# Patient Record
Sex: FEMALE | Race: BLACK OR AFRICAN AMERICAN | Employment: FULL TIME | URBAN - METROPOLITAN AREA
[De-identification: names, ages, dates, MRNs, and addresses within clinical notes are randomized per-mention and may not be internally consistent; named-entity substitution may affect disease eponyms.]

---

## 2017-02-06 ENCOUNTER — HOSPITAL ENCOUNTER (EMERGENCY)
Age: 32
Discharge: HOME | End: 2017-02-06
Attending: EMERGENCY MEDICINE
Payer: MEDICAID

## 2017-02-06 VITALS
WEIGHT: 213 LBS | DIASTOLIC BLOOD PRESSURE: 92 MMHG | TEMPERATURE: 97.5 F | BODY MASS INDEX: 35.49 KG/M2 | SYSTOLIC BLOOD PRESSURE: 140 MMHG | HEART RATE: 100 BPM | HEIGHT: 65 IN | RESPIRATION RATE: 16 BRPM | OXYGEN SATURATION: 98 %

## 2017-02-06 DIAGNOSIS — B34.9 VIRAL SYNDROME: Primary | ICD-10-CM

## 2017-02-06 LAB
BACTERIA URNS QL MICRO: ABNORMAL /HPF
CASTS URNS QL MICRO: 0 /LPF
CRYSTALS URNS QL MICRO: 0 /LPF
DEPRECATED S PYO AG THROAT QL EIA: NEGATIVE
EPI CELLS #/AREA URNS HPF: ABNORMAL /HPF
FLUAV AG NPH QL IA: NEGATIVE
FLUBV AG NPH QL IA: NEGATIVE
HCG UR QL: NEGATIVE
MUCOUS THREADS URNS QL MICRO: 0 /LPF
OTHER OBSERVATIONS,UCOM: ABNORMAL
RBC #/AREA URNS HPF: ABNORMAL /HPF
WBC URNS QL MICRO: ABNORMAL /HPF

## 2017-02-06 PROCEDURE — 99284 EMERGENCY DEPT VISIT MOD MDM: CPT | Performed by: EMERGENCY MEDICINE

## 2017-02-06 PROCEDURE — 87880 STREP A ASSAY W/OPTIC: CPT | Performed by: EMERGENCY MEDICINE

## 2017-02-06 PROCEDURE — 87081 CULTURE SCREEN ONLY: CPT | Performed by: EMERGENCY MEDICINE

## 2017-02-06 PROCEDURE — 87804 INFLUENZA ASSAY W/OPTIC: CPT | Performed by: EMERGENCY MEDICINE

## 2017-02-06 PROCEDURE — 74011250637 HC RX REV CODE- 250/637: Performed by: EMERGENCY MEDICINE

## 2017-02-06 PROCEDURE — 81015 MICROSCOPIC EXAM OF URINE: CPT | Performed by: EMERGENCY MEDICINE

## 2017-02-06 PROCEDURE — 81025 URINE PREGNANCY TEST: CPT

## 2017-02-06 RX ORDER — ONDANSETRON 4 MG/1
4 TABLET, ORALLY DISINTEGRATING ORAL
Qty: 11 TAB | Refills: 1 | Status: SHIPPED | OUTPATIENT
Start: 2017-02-06 | End: 2018-06-18

## 2017-02-06 RX ORDER — IBUPROFEN 400 MG/1
400 TABLET ORAL
Status: COMPLETED | OUTPATIENT
Start: 2017-02-06 | End: 2017-02-06

## 2017-02-06 RX ORDER — DEXAMETHASONE SODIUM PHOSPHATE 100 MG/10ML
10 INJECTION INTRAMUSCULAR; INTRAVENOUS
Status: COMPLETED | OUTPATIENT
Start: 2017-02-06 | End: 2017-02-06

## 2017-02-06 RX ADMIN — IBUPROFEN 400 MG: 400 TABLET, FILM COATED ORAL at 14:17

## 2017-02-06 RX ADMIN — DEXAMETHASONE SODIUM PHOSPHATE 10 MG: 10 INJECTION INTRAMUSCULAR; INTRAVENOUS at 14:18

## 2017-02-06 NOTE — DISCHARGE INSTRUCTIONS
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
## 2017-02-06 NOTE — LETTER
[facility] EMERGENCY DEPT [address] [address] 
[phone] Work/School Note Date: 2/6/2017 To Whom It May concern: 
 
Yossi Valenzuela was seen and treated today in the emergency room by the following provider(s): 
Attending Provider: Kanu Minor MD. Yossi Valenzuela may return to work on 2/9. Sincerely, 
 
 
 
 
Kanu Minor MD

## 2017-02-06 NOTE — ED TRIAGE NOTES
Per patient generalized body aches since this morning, patient states of throat pain that started yesterday. With intermittent fever and chills.

## 2017-02-08 LAB
BACTERIA SPEC CULT: NORMAL
SERVICE CMNT-IMP: NORMAL

## 2017-05-08 ENCOUNTER — APPOINTMENT (OUTPATIENT)
Dept: GENERAL RADIOLOGY | Age: 32
End: 2017-05-08
Payer: MEDICAID

## 2017-05-08 ENCOUNTER — APPOINTMENT (OUTPATIENT)
Dept: ULTRASOUND IMAGING | Age: 32
End: 2017-05-08
Attending: EMERGENCY MEDICINE
Payer: MEDICAID

## 2017-05-08 ENCOUNTER — HOSPITAL ENCOUNTER (EMERGENCY)
Age: 32
Discharge: HOME | End: 2017-05-08
Attending: EMERGENCY MEDICINE
Payer: MEDICAID

## 2017-05-08 ENCOUNTER — APPOINTMENT (OUTPATIENT)
Dept: CT IMAGING | Age: 32
End: 2017-05-08
Attending: EMERGENCY MEDICINE
Payer: MEDICAID

## 2017-05-08 VITALS
SYSTOLIC BLOOD PRESSURE: 123 MMHG | WEIGHT: 216 LBS | OXYGEN SATURATION: 100 % | HEIGHT: 64 IN | DIASTOLIC BLOOD PRESSURE: 58 MMHG | HEART RATE: 69 BPM | RESPIRATION RATE: 20 BRPM | BODY MASS INDEX: 36.88 KG/M2 | TEMPERATURE: 97.8 F

## 2017-05-08 DIAGNOSIS — R04.2 HEMOPTYSIS: Primary | ICD-10-CM

## 2017-05-08 LAB
ALBUMIN SERPL BCP-MCNC: 3.5 G/DL (ref 3.5–5)
ALBUMIN/GLOB SERPL: 0.9 {RATIO} (ref 1.2–3.5)
ALP SERPL-CCNC: 77 U/L (ref 50–136)
ALT SERPL-CCNC: 26 U/L (ref 12–65)
ANION GAP BLD CALC-SCNC: 9 MMOL/L (ref 7–16)
AST SERPL W P-5'-P-CCNC: 18 U/L (ref 15–37)
ATRIAL RATE: 68 BPM
BASOPHILS # BLD AUTO: 0 K/UL (ref 0–0.2)
BASOPHILS # BLD: 0 % (ref 0–2)
BILIRUB SERPL-MCNC: 0.3 MG/DL (ref 0.2–1.1)
BUN SERPL-MCNC: 12 MG/DL (ref 6–23)
CALCIUM SERPL-MCNC: 8.9 MG/DL (ref 8.3–10.4)
CALCULATED P AXIS, ECG09: 58 DEGREES
CALCULATED R AXIS, ECG10: 57 DEGREES
CALCULATED T AXIS, ECG11: 60 DEGREES
CHLORIDE SERPL-SCNC: 108 MMOL/L (ref 98–107)
CO2 SERPL-SCNC: 25 MMOL/L (ref 21–32)
CREAT SERPL-MCNC: 0.66 MG/DL (ref 0.6–1)
DIAGNOSIS, 93000: NORMAL
DIFFERENTIAL METHOD BLD: ABNORMAL
EOSINOPHIL # BLD: 0.3 K/UL (ref 0–0.8)
EOSINOPHIL NFR BLD: 5 % (ref 0.5–7.8)
ERYTHROCYTE [DISTWIDTH] IN BLOOD BY AUTOMATED COUNT: 12.2 % (ref 11.9–14.6)
GLOBULIN SER CALC-MCNC: 3.8 G/DL (ref 2.3–3.5)
GLUCOSE SERPL-MCNC: 108 MG/DL (ref 65–100)
HCT VFR BLD AUTO: 40.1 % (ref 35.8–46.3)
HGB BLD-MCNC: 13.6 G/DL (ref 11.7–15.4)
IMM GRANULOCYTES # BLD: 0 K/UL (ref 0–0.5)
IMM GRANULOCYTES NFR BLD AUTO: 0.3 % (ref 0–5)
LYMPHOCYTES # BLD AUTO: 37 % (ref 13–44)
LYMPHOCYTES # BLD: 2.2 K/UL (ref 0.5–4.6)
MCH RBC QN AUTO: 31.2 PG (ref 26.1–32.9)
MCHC RBC AUTO-ENTMCNC: 33.9 G/DL (ref 31.4–35)
MCV RBC AUTO: 92 FL (ref 79.6–97.8)
MONOCYTES # BLD: 0.3 K/UL (ref 0.1–1.3)
MONOCYTES NFR BLD AUTO: 5 % (ref 4–12)
NEUTS SEG # BLD: 3.2 K/UL (ref 1.7–8.2)
NEUTS SEG NFR BLD AUTO: 53 % (ref 43–78)
P-R INTERVAL, ECG05: 186 MS
PLATELET # BLD AUTO: 223 K/UL (ref 150–450)
PMV BLD AUTO: 10.7 FL (ref 10.8–14.1)
POTASSIUM SERPL-SCNC: 3.8 MMOL/L (ref 3.5–5.1)
PROT SERPL-MCNC: 7.3 G/DL (ref 6.3–8.2)
Q-T INTERVAL, ECG07: 384 MS
QRS DURATION, ECG06: 80 MS
QTC CALCULATION (BEZET), ECG08: 408 MS
RBC # BLD AUTO: 4.36 M/UL (ref 4.05–5.25)
SODIUM SERPL-SCNC: 142 MMOL/L (ref 136–145)
TROPONIN I BLD-MCNC: 0 NG/ML (ref 0–0.08)
TROPONIN I SERPL-MCNC: <0.02 NG/ML (ref 0.02–0.05)
VENTRICULAR RATE, ECG03: 68 BPM
WBC # BLD AUTO: 6 K/UL (ref 4.3–11.1)

## 2017-05-08 PROCEDURE — 84484 ASSAY OF TROPONIN QUANT: CPT | Performed by: EMERGENCY MEDICINE

## 2017-05-08 PROCEDURE — 74011000258 HC RX REV CODE- 258: Performed by: EMERGENCY MEDICINE

## 2017-05-08 PROCEDURE — 93005 ELECTROCARDIOGRAM TRACING: CPT | Performed by: EMERGENCY MEDICINE

## 2017-05-08 PROCEDURE — 93971 EXTREMITY STUDY: CPT

## 2017-05-08 PROCEDURE — 74011636320 HC RX REV CODE- 636/320: Performed by: EMERGENCY MEDICINE

## 2017-05-08 PROCEDURE — 71020 XR CHEST PA LAT: CPT

## 2017-05-08 PROCEDURE — 85025 COMPLETE CBC W/AUTO DIFF WBC: CPT | Performed by: EMERGENCY MEDICINE

## 2017-05-08 PROCEDURE — 99285 EMERGENCY DEPT VISIT HI MDM: CPT | Performed by: EMERGENCY MEDICINE

## 2017-05-08 PROCEDURE — 71260 CT THORAX DX C+: CPT

## 2017-05-08 PROCEDURE — 80053 COMPREHEN METABOLIC PANEL: CPT | Performed by: EMERGENCY MEDICINE

## 2017-05-08 RX ORDER — SODIUM CHLORIDE 0.9 % (FLUSH) 0.9 %
10 SYRINGE (ML) INJECTION
Status: COMPLETED | OUTPATIENT
Start: 2017-05-08 | End: 2017-05-08

## 2017-05-08 RX ORDER — DOXYCYCLINE HYCLATE 100 MG
100 TABLET ORAL 2 TIMES DAILY
Qty: 20 TAB | Refills: 0 | Status: SHIPPED | OUTPATIENT
Start: 2017-05-08 | End: 2017-05-18

## 2017-05-08 RX ADMIN — SODIUM CHLORIDE 100 ML: 900 INJECTION, SOLUTION INTRAVENOUS at 12:31

## 2017-05-08 RX ADMIN — IOPAMIDOL 100 ML: 755 INJECTION, SOLUTION INTRAVENOUS at 12:31

## 2017-05-08 RX ADMIN — Medication 10 ML: at 12:31

## 2017-05-08 NOTE — ED TRIAGE NOTES
Reports coughing up blood x 3 days. States is in the mornings. States chest hurts when she breathes and her inhaler helps.
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
## 2017-05-08 NOTE — DISCHARGE INSTRUCTIONS
Coughing Up Blood: Care Instructions  Your Care Instructions  Coughing up blood can be frightening. The blood may come from the lungs, stomach, or throat. You may cough up a few thin streaks of bright red blood. This probably is not a cause for concern. Coughing up large amounts of bright red blood or rust-colored mucus from the lungs can be a symptom of a more serious condition. Several conditions can make you cough up blood from the lungs. These include bronchitis and pneumonia, or more serious problems such as cancer or a blood clot in the lung (pulmonary embolus). Depending on what is causing your cough, it may go away after the illness is treated. Your doctor may tell you not to suppress the cough with cough medicine if it is better for you to cough up the blood and spit it out. Follow-up care is a key part of your treatment and safety. Be sure to make and go to all appointments, and call your doctor if you are having problems. Its also a good idea to know your test results and keep a list of the medicines you take. How can you care for yourself at home? · Make a note of when and for how long you cough up blood. Also note if you are coughing up spit with a small amount of blood, or mostly blood. Take this information to your next appointment with your doctor. · Increase your fluid intake to at least 8 to 10 glasses of water every day. This helps keep the mucus thin and helps you cough it up. If you have kidney, heart, or liver disease and have to limit fluids, talk with your doctor before you increase your fluid intake. · If your doctor prescribed antibiotics, take them as directed. Do not stop taking them just because you feel better. You need to take the full course of antibiotics. · Do not take cough medicine without your doctor's guidance. They can cause problems if you have other health problems. They can also interact with other medicine.   · Do not smoke or use other forms of tobacco, especially while you have a cough. Smoking can make coughing worse. If you need help quitting, talk to your doctor about stop-smoking programs and medicines. These can increase your chances of quitting for good. · Avoid exposure to smoke, dust, or other pollutants. When should you call for help? Call 911 anytime you think you may need emergency care. For example, call if:  · You have sudden chest pain and shortness of breath. · You have severe trouble breathing. Call your doctor now or seek immediate medical care if:  · You have wheezing and difficulty breathing. · You are dizzy or lightheaded, or you feel like you may faint. · You cough up clots of blood. Watch closely for changes in your health, and be sure to contact your doctor if:  · You do not get better as expected. · You have any new symptoms, such as chest pain with difficulty breathing or a fever. Where can you learn more? Go to http://shira-kelvin.info/. Enter M550 in the search box to learn more about \"Coughing Up Blood: Care Instructions. \"  Current as of: May 27, 2016  Content Version: 11.2  © 0321-3740 [employer]. Care instructions adapted under license by [employer] (which disclaims liability or warranty for this information). If you have questions about a medical condition or this instruction, always ask your healthcare professional. [address] any warranty or liability for your use of this information. Doxycycline (By mouth)   Doxycycline ([id number])  Treats and prevents infections. Also used to prevent malaria and treat rosacea or severe acne. This medicine is a tetracycline antibiotic.    Brand Name(s): Acticlate, Adoxa, Adoxa Festus 1/150, Avidoxy, Avidoxy DK, BenzoDox 30 Kit, BenzoDox 60 Kit, Doryx, Doryx MPC, Monodox, Morgidox [id number], Morgidox [id number] Kit, Morgidox 1x50MG, Morgidox 1x50MG Kit, Morgidox [id number]   There may be other brand names for this medicine. When This Medicine Should Not Be Used: This medicine is not right for everyone. Do not use it if you had an allergic reaction to doxycycline or another tetracycline antibiotic, or if you are pregnant or breastfeeding. How to Use This Medicine:   Capsule, Delayed Release Capsule, Long Acting Capsule, Liquid, Tablet, Delayed Release Tablet  · Your doctor will tell you how much medicine to use. Do not use more than directed. · Ask your pharmacist or doctor if you need to take this medicine with or without food. Some forms can be taken with food or milk, but others must be taken on an empty stomach. · Tablets: You may take this medicine with food or milk to avoid stomach irritation. To break a tablet, hold the tablet between your thumb and index fingers close to the appropriate scored line. Then, apply enough pressure to snap the tablet segments apart. Do not use the tablet if it does not break on the scored lines. · Delayed-release tablets: You may also take this medicine by sprinkling the broken tablets onto room-temperature applesauce. Swallow this mixture right away; do not chew. Do not store the mixture for later use. · Oracea® capsules: This medicine must be taken on an empty stomach, at least 1 hour before or 2 hours after a meal.  · Capsule: Swallow whole. Do not break, crush, chew, or open it. · Oral liquid: Shake the bottle well just before each use. Measure the oral liquid medicine with a marked measuring spoon, oral syringe, or medicine cup. · Take all of the medicine in your prescription to clear up your infection, even if you feel better after the first few doses. · Drink plenty of fluids to avoid throat problems, if you take the capsule or tablet form. · Malaria prevention: Start taking the medicine 1 or 2 days before you travel. Take the medicine every day during your trip. Keep taking it for 4 weeks after you return. However, do not use the medicine for longer than 4 months.   · Do not use this medicine for more than 9 months if you are using it for rosacea. · Use only the brand of medicine your doctor prescribed. Other brands may not work the same way. · Read and follow the patient instructions that come with this medicine. Talk to your doctor or pharmacist if you have any questions. · Missed dose: Take a dose as soon as you remember. If it is almost time for your next dose, wait until then and take a regular dose. Do not take extra medicine to make up for a missed dose. · Store the medicine in a closed container at room temperature, away from heat, moisture, and direct light. Do not freeze the oral liquid. Drugs and Foods to Avoid:   Ask your doctor or pharmacist before using any other medicine, including over-the-counter medicines, vitamins, and herbal products. · Some foods and medicines can affect how doxycycline works. Tell your doctor if you are using any of the following:  ¨ Bismuth subsalicylate, isotretinoin or other acne medicines, acitretin or other medicine to treat psoriasis  ¨ Penicillin antibiotic, birth control pills, medicine for seizures (such as carbamazepine, phenobarbital, phenytoin), stomach medicine, a blood thinner (such as warfarin), or any medicine that contains aluminum, calcium, or iron (such an antacid or vitamin supplement)  Warnings While Using This Medicine:   · This medicine may cause birth defects if either partner is using it during conception or pregnancy. Tell your doctor right away if you or your partner becomes pregnant. Birth control pills may not work as well when used with this medicine. Use a second form of birth control to keep from getting pregnant. · Tell your doctor if you have kidney disease, liver disease, asthma, or an allergy to sulfites. Tell your doctor if you had surgery on your stomach, or if you have a history of yeast infections.   · This medicine may cause the following problems:  ¨ Permanent change in tooth color (in children younger than 6years old)  ¨ Increased pressure inside the head  ¨ Yeast infection  ¨ Immune system problems  · This medicine can cause diarrhea. Call your doctor if the diarrhea becomes severe, does not stop, or is bloody. Do not take any medicine to stop diarrhea until you have talked to your doctor. Diarrhea can occur 2 months or more after you stop taking this medicine. · This medicine may make your skin more sensitive to sunlight. Wear sunscreen. Do not use sunlamps or tanning beds. · Tell any doctor or dentist who treats you that you are using this medicine. This medicine may affect certain medical test results. · Call your doctor if your symptoms do not improve or if they get worse. · Keep all medicine out of the reach of children. Never share your medicine with anyone. Possible Side Effects While Using This Medicine:   Call your doctor right away if you notice any of these side effects:  · Allergic reaction: Itching or hives, swelling in your face or hands, swelling or tingling in your mouth or throat, chest tightness, trouble breathing  · Blistering, peeling, red skin rash  · Burning, pain, or irritation in your upper stomach or throat  · Diarrhea that may contain blood  · Fever, chills, cough, runny or stuffy nose, sore throat, and body aches  · Joint pain, fever, rash, and unusual tiredness or weakness  · Severe headache, dizziness, or vision changes  If you notice these less serious side effects, talk with your doctor:   · Darkening of your skin, scars, teeth, or gums  · Sores or white patches on your lips, mouth, or throat  If you notice other side effects that you think are caused by this medicine, tell your doctor. Call your doctor for medical advice about side effects. You may report side effects to FDA at 4-598-FDA-2783  © 2017 [facility] Information is for End User's use only and may not be sold, redistributed or otherwise used for commercial purposes.   The above information is an  only. It is not intended as medical advice for individual conditions or treatments. Talk to your doctor, nurse or pharmacist before following any medical regimen to see if it is safe and effective for you.

## 2018-06-18 ENCOUNTER — HOSPITAL ENCOUNTER (EMERGENCY)
Age: 33
Discharge: HOME | End: 2018-06-18
Attending: EMERGENCY MEDICINE
Payer: MEDICAID

## 2018-06-18 ENCOUNTER — APPOINTMENT (OUTPATIENT)
Dept: GENERAL RADIOLOGY | Age: 33
End: 2018-06-18
Payer: MEDICAID

## 2018-06-18 VITALS
SYSTOLIC BLOOD PRESSURE: 122 MMHG | BODY MASS INDEX: 32.74 KG/M2 | HEIGHT: 68 IN | HEART RATE: 76 BPM | DIASTOLIC BLOOD PRESSURE: 82 MMHG | RESPIRATION RATE: 18 BRPM | OXYGEN SATURATION: 100 % | WEIGHT: 216 LBS

## 2018-06-18 DIAGNOSIS — T14.8XXA MUSCLE STRAIN: ICD-10-CM
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
## 2018-06-18 PROCEDURE — 72100 X-RAY EXAM L-S SPINE 2/3 VWS: CPT

## 2018-06-18 PROCEDURE — 99283 EMERGENCY DEPT VISIT LOW MDM: CPT | Performed by: PHYSICIAN ASSISTANT

## 2018-06-18 RX ORDER — NAPROXEN 500 MG/1
500 TABLET ORAL 2 TIMES DAILY WITH MEALS
Qty: 20 TAB | Refills: 0 | Status: SHIPPED | OUTPATIENT
Start: 2018-06-18 | End: 2018-06-28

## 2018-06-18 RX ORDER — METHOCARBAMOL 750 MG/1
750 TABLET, FILM COATED ORAL 3 TIMES DAILY
Qty: 30 TAB | Refills: 0 | Status: SHIPPED | OUTPATIENT
Start: 2018-06-18 | End: 2018-06-28

## 2018-06-18 NOTE — Clinical Note
Use meds as directed alternate ice and heat to all sore areas, call your primary md office in am to arrange follow up appt
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
## 2018-06-18 NOTE — LETTER
[facility] EMERGENCY DEPT One [address] [address] [phone] Work/School Note Date: 6/18/2018 To Whom It May concern: 
 
Rexie Riedel was seen and treated today in the emergency room by the following provider(s): 
Attending Provider: Carie Walker MD 
Physician Assistant: JONATHAN Arcos. Rexie Riedel may return to work on 6-20-18. Sincerely, JONATHAN Arcos
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
## 2018-06-18 NOTE — ED NOTES
I have reviewed discharge instructions with the patient. The patient verbalized understanding. Patient left ED via Discharge Method: ambulatory to Home with self    Opportunity for questions and clarification provided. Patient given 2 scripts. To continue your aftercare when you leave the hospital, you may receive an automated call from our care team to check in on how you are doing. This is a free service and part of our promise to provide the best care and service to meet your aftercare needs.  If you have questions, or wish to unsubscribe from this service please call [phone]. Thank you for Choosing our [facility] Emergency Department.

## 2018-09-06 PROCEDURE — 99283 EMERGENCY DEPT VISIT LOW MDM: CPT | Performed by: EMERGENCY MEDICINE

## 2018-09-07 ENCOUNTER — APPOINTMENT (OUTPATIENT)
Dept: GENERAL RADIOLOGY | Age: 33
End: 2018-09-07
Attending: EMERGENCY MEDICINE
Payer: MEDICAID

## 2018-09-07 ENCOUNTER — HOSPITAL ENCOUNTER (EMERGENCY)
Age: 33
Discharge: HOME | End: 2018-09-07
Attending: EMERGENCY MEDICINE
Payer: MEDICAID

## 2018-09-07 VITALS
OXYGEN SATURATION: 100 % | DIASTOLIC BLOOD PRESSURE: 86 MMHG | BODY MASS INDEX: 32.74 KG/M2 | SYSTOLIC BLOOD PRESSURE: 151 MMHG | WEIGHT: 216 LBS | TEMPERATURE: 98 F | HEART RATE: 96 BPM | RESPIRATION RATE: 20 BRPM | HEIGHT: 68 IN

## 2018-09-07 DIAGNOSIS — B34.9 VIRAL ILLNESS: Primary | ICD-10-CM

## 2018-09-07 DIAGNOSIS — J45.41 MODERATE PERSISTENT REACTIVE AIRWAY DISEASE WITH ACUTE EXACERBATION: ICD-10-CM

## 2018-09-07 LAB
FLUAV AG NPH QL IA: NEGATIVE
FLUBV AG NPH QL IA: NEGATIVE
SPECIMEN SOURCE: NORMAL

## 2018-09-07 PROCEDURE — 87804 INFLUENZA ASSAY W/OPTIC: CPT

## 2018-09-07 PROCEDURE — 74011636637 HC RX REV CODE- 636/637: Performed by: EMERGENCY MEDICINE

## 2018-09-07 PROCEDURE — 74011000250 HC RX REV CODE- 250: Performed by: EMERGENCY MEDICINE

## 2018-09-07 PROCEDURE — 94640 AIRWAY INHALATION TREATMENT: CPT

## 2018-09-07 PROCEDURE — 71046 X-RAY EXAM CHEST 2 VIEWS: CPT

## 2018-09-07 RX ORDER — ALBUTEROL SULFATE 2.5 MG/.5ML
5 SOLUTION RESPIRATORY (INHALATION)
Status: COMPLETED | OUTPATIENT
Start: 2018-09-07 | End: 2018-09-07

## 2018-09-07 RX ORDER — ALBUTEROL SULFATE 90 UG/1
2 AEROSOL, METERED RESPIRATORY (INHALATION)
Qty: 1 INHALER | Refills: 2 | Status: SHIPPED | OUTPATIENT
Start: 2018-09-07

## 2018-09-07 RX ORDER — PREDNISONE 50 MG/1
50 TABLET ORAL DAILY
Qty: 5 TAB | Refills: 0 | Status: SHIPPED | OUTPATIENT
Start: 2018-09-07 | End: 2018-09-12

## 2018-09-07 RX ORDER — OXYMETAZOLINE HCL 0.05 %
2 SPRAY, NON-AEROSOL (ML) NASAL 2 TIMES DAILY
Qty: 1 EACH | Refills: 0 | Status: SHIPPED | OUTPATIENT
Start: 2018-09-07 | End: 2018-09-10

## 2018-09-07 RX ADMIN — PREDNISONE 60 MG: 50 TABLET ORAL at 02:16

## 2018-09-07 RX ADMIN — ALBUTEROL SULFATE 5 MG: 2.5 SOLUTION RESPIRATORY (INHALATION) at 02:05

## 2018-09-07 NOTE — LETTER
[address] [facility] EMERGENCY DEPT One [address] [address] 
[phone] Work/School Note Date: 9/6/2018 To Whom It May concern: 
 
Charlene Bowling was seen and treated today in the emergency room by the following provider(s): 
Attending Provider: Yoselyn De Leon MD. Charlene Bowling may return to work on Sunday September 9th 2018. Sincerely, Charles Quintanilla RN

## 2018-09-07 NOTE — ED NOTES
To bedside to medicate. Patient upset. Patient states \"this is fucking bullshit. That doctor isn't going to give me any pain medicine for my pain. \" I advised patient that Dr. Katey Ribeiro is appropriately covering her wheeze and that reduction in wheeze should help with some of her discomfort. In addition, I advised patient that anti-inflammatories would be appropriate for home use. Patient states [place] what the fuck ever. \"  Due to high census, multiple patients in [facility] within [place] [address]. I asked the patient to please refrain from using obscenities. Patient became more irate and states \"I can say whatever the fuck I want. \"  I attempted to calm patient with patient becoming more and more angry. Patient asked for my name which I provided. Patient states \"I'm going to have your fucking job. Just get away from me and get the doctor to write those prescriptions. \"

## 2018-09-07 NOTE — DISCHARGE INSTRUCTIONS
Using a Metered-Dose Inhaler: Care Instructions  Your Care Instructions    A metered-dose inhaler lets you breathe medicine into your lungs quickly. Inhaled medicine works faster than the same medicine in a pill. An inhaler allows you to take less medicine than you would need if you took it as a pill. \"Metered-dose\" means that the inhaler gives a measured amount of medicine each time you use it. A metered-dose inhaler gives medicine in the form of a liquid mist.  Your doctor may want you to use a spacer with your inhaler. A spacer is a chamber that you attach to the inhaler. The chamber holds the medicine before you inhale it. That way, you can inhale the medicine in as many breaths as you need. Doctors recommend using a spacer with most metered-dose inhalers, especially those with corticosteroid medicines. Follow-up care is a key part of your treatment and safety. Be sure to make and go to all appointments, and call your doctor if you are having problems. It's also a good idea to know your test results and keep a list of the medicines you take. How can you care for yourself at home? To get started using your inhaler  · Talk with your health care provider to be sure you are using your inhaler the right way. It might help if you practice using it in front of a mirror. Use the inhaler exactly as prescribed. · Check that you have the correct medicine. If you use more than one inhaler, put a label on each one. This will let you know which one to use at the right time. · Keep track of how much medicine is in the inhaler. Check the label to see how many doses are in the container. If you know how many puffs you can take, you can replace the inhaler before you run out. Ask your health care provider how you can keep track of how much medicine is left. · Use a spacer if you have problems pressing the inhaler and breathing in at the same time.  You also may need a spacer if you are using corticosteroid medicines. · If you are using a corticosteroid inhaler, gargle and rinse out your mouth with water after use. Do not swallow the water. Swallowing the water will increase the chance that the medicine will get into your bloodstream. This may make it more likely that you will have side effects. To use a spacer with an inhaler  1. Shake the inhaler. Remove the inhaler cap, and place the mouthpiece of the inhaler into the spacer. Check the inhaler instructions to see if you need to prime your inhaler before you use it. If it needs priming, follow the instructions on how to prime your inhaler. 2. Remove the cap from the spacer. 3. Hold the inhaler upright with the mouthpiece at the bottom. 4. Tilt your head back a little, and breathe out slowly and completely. 5. Place the spacer's mouthpiece in your mouth. 6. Press down on the inhaler to spray one puff of medicine into the spacer, and then start breathing in slowly. Wait to inhale until after you have pressed down on the inhaler. Some spacers have a whistle. If you hear it, you should breathe in more slowly. 7. Hold your breath for 10 seconds. This will let the medicine settle in your lungs. 8. If you need to take a second dose, wait 30 to 60 seconds to allow the inhaler valve to refill. To use an inhaler without a spacer  1. Shake the inhaler as directed. Remove the cap. Check the instructions to see if you need to prime your inhaler before you use it. If it needs priming, follow the instructions on how to prime your inhaler. 2. Hold the inhaler upright with the mouthpiece at the bottom. 3. Tilt your head back a little, and breathe out slowly and completely. 4. Position the inhaler in one of two ways:  ¨ You can place the inhaler in your mouth. This is easier for most people. And it lowers the risk that any of the medicine will get into your eyes. ¨ Or you can place the inhaler 1 to 2 inches in front of your open mouth, without closing your lips over it. Try to open your mouth as wide as you can. Placing the inhaler in front of your open mouth may be better for getting the medicine into your lungs. But some people may find this too hard to do. 5. Start taking slow, even breaths through your mouth. Press down on the inhaler once, then inhale fully. 6. Hold your breath for 10 seconds. This will let the medicine settle in your lungs. 7. If you need to take a second dose, wait 30 to 60 seconds to allow the inhaler valve to refill. Where can you learn more? Go to http://shira-kelvin.info/. Enter K111 in the search box to learn more about \"Using a Metered-Dose Inhaler: Care Instructions. \"  Current as of: November 12, 2017  Content Version: 11.7  © 8918-6784 [employer]. Care instructions adapted under license by [employer] (which disclaims liability or warranty for this information). If you have questions about a medical condition or this instruction, always ask your healthcare professional. [place] [address] any warranty or liability for your use of this information. Viral Infections: Care Instructions  Your Care Instructions    You don't feel well, but it's not clear what's causing it. You may have a viral infection. Viruses cause many illnesses, such as the common cold, influenza, fever, rashes, and the diarrhea, nausea, and vomiting that are often called \"stomach flu. \" You may wonder if antibiotic medicines could make you feel better. But antibiotics only treat infections caused by bacteria. They don't work on viruses. The good news is that viral infections usually aren't serious. Most will go away in a few days without medical treatment. In the meantime, there are a few things you can do to make yourself more comfortable. Follow-up care is a key part of your treatment and safety. Be sure to make and go to all appointments, and call your doctor if you are having problems.  It's also a good idea to know your test results and keep a list of the medicines you take. How can you care for yourself at home? · Get plenty of rest if you feel tired. · Take an over-the-counter pain medicine if needed, such as acetaminophen (Tylenol), ibuprofen (Advil, Motrin), or naproxen (Aleve). Read and follow all instructions on the label. · Be careful when taking over-the-counter cold or flu medicines and Tylenol at the same time. Many of these medicines have acetaminophen, which is Tylenol. Read the labels to make sure that you are not taking more than the recommended dose. Too much acetaminophen (Tylenol) can be harmful. · Drink plenty of fluids, enough so that your urine is light yellow or clear like water. If you have kidney, heart, or liver disease and have to limit fluids, talk with your doctor before you increase the amount of fluids you drink. · Stay home from work, school, and other public places while you have a fever. When should you call for help? Call 911 anytime you think you may need emergency care. For example, call if:    · You have severe trouble breathing.     · You passed out (lost consciousness).    Call your doctor now or seek immediate medical care if:    · You seem to be getting much sicker.     · You have a new or higher fever.     · You have blood in your stools.     · You have new belly pain, or your pain gets worse.     · You have a new rash.    Watch closely for changes in your health, and be sure to contact your doctor if:    · You start to get better and then get worse.     · You do not get better as expected. Where can you learn more? Go to http://shira-kelvin.info/. Enter X650 in the search box to learn more about \"Viral Infections: Care Instructions. \"  Current as of: November 18, 2017  Content Version: 11.7  © 5646-6993 [employer], [employer].  Care instructions adapted under license by [employer] (which disclaims liability or warranty for this information). If you have questions about a medical condition or this instruction, always ask your healthcare professional. [place] [address] any warranty or liability for your use of this information.
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
## 2018-09-07 NOTE — ED NOTES
I have reviewed discharge instructions with the patient. The patient verbalized understanding. Patient to follow up with PMD and RTED with any changes/concerns. Patient expresses understanding. Patient ambulatory from ED in NAD with Rx x 3.  Patient provided with work excuse upon request.

## 2018-11-29 ENCOUNTER — HOSPITAL ENCOUNTER (EMERGENCY)
Age: 33
Discharge: HOME | End: 2018-11-29
Attending: EMERGENCY MEDICINE
Payer: MEDICAID

## 2018-11-29 VITALS
DIASTOLIC BLOOD PRESSURE: 74 MMHG | BODY MASS INDEX: 32.74 KG/M2 | HEIGHT: 68 IN | HEART RATE: 74 BPM | OXYGEN SATURATION: 99 % | TEMPERATURE: 98.2 F | WEIGHT: 216.05 LBS | SYSTOLIC BLOOD PRESSURE: 135 MMHG | RESPIRATION RATE: 16 BRPM

## 2018-11-29 DIAGNOSIS — A59.9 TRICHOMONOSIS: Primary | ICD-10-CM

## 2018-11-29 LAB
BACTERIA URNS QL MICRO: 0 /HPF
CASTS URNS QL MICRO: NORMAL /LPF
EPI CELLS #/AREA URNS HPF: NORMAL /HPF
HCG UR QL: NEGATIVE
RBC #/AREA URNS HPF: NORMAL /HPF
SERVICE CMNT-IMP: NORMAL
WBC URNS QL MICRO: NORMAL /HPF
WET PREP GENITAL: NORMAL

## 2018-11-29 PROCEDURE — 99284 EMERGENCY DEPT VISIT MOD MDM: CPT | Performed by: EMERGENCY MEDICINE

## 2018-11-29 PROCEDURE — 74011250636 HC RX REV CODE- 250/636: Performed by: NURSE PRACTITIONER

## 2018-11-29 PROCEDURE — 87210 SMEAR WET MOUNT SALINE/INK: CPT

## 2018-11-29 PROCEDURE — 96372 THER/PROPH/DIAG INJ SC/IM: CPT | Performed by: EMERGENCY MEDICINE

## 2018-11-29 PROCEDURE — 87086 URINE CULTURE/COLONY COUNT: CPT

## 2018-11-29 PROCEDURE — 81025 URINE PREGNANCY TEST: CPT

## 2018-11-29 PROCEDURE — 87491 CHLMYD TRACH DNA AMP PROBE: CPT

## 2018-11-29 PROCEDURE — 74011250637 HC RX REV CODE- 250/637: Performed by: NURSE PRACTITIONER

## 2018-11-29 PROCEDURE — 81015 MICROSCOPIC EXAM OF URINE: CPT

## 2018-11-29 PROCEDURE — 81003 URINALYSIS AUTO W/O SCOPE: CPT | Performed by: EMERGENCY MEDICINE

## 2018-11-29 RX ORDER — AZITHROMYCIN 250 MG/1
1000 TABLET, FILM COATED ORAL
Status: COMPLETED | OUTPATIENT
Start: 2018-11-29 | End: 2018-11-29

## 2018-11-29 RX ORDER — METRONIDAZOLE 500 MG/1
2000 TABLET ORAL
Qty: 4 TAB | Refills: 0 | Status: SHIPPED | OUTPATIENT
Start: 2018-11-29 | End: 2018-11-29

## 2018-11-29 RX ADMIN — AZITHROMYCIN 1000 MG: 250 TABLET, FILM COATED ORAL at 13:34

## 2018-11-29 RX ADMIN — CEFTRIAXONE SODIUM 250 MG: 250 INJECTION, POWDER, FOR SOLUTION INTRAMUSCULAR; INTRAVENOUS at 13:34
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
## 2018-11-29 NOTE — DISCHARGE INSTRUCTIONS
Flagyl as prescribed. No sex for 10 days after treatment. Your partner will need to be treated or you will get reinfected. Someone will call you in 4-5 days if your cultures are positive. Return to the Emergency Department for any new or worse symptoms.

## 2018-11-29 NOTE — ED NOTES
I have reviewed discharge instructions with the patient. The patient verbalized understanding. Patient left ED via Discharge Method: ambulatory to Home with (self). Opportunity for questions and clarification provided. Patient given 1 scripts. To continue your aftercare when you leave the hospital, you may receive an automated call from our care team to check in on how you are doing. This is a free service and part of our promise to provide the best care and service to meet your aftercare needs.  If you have questions, or wish to unsubscribe from this service please call [phone]. Thank you for Choosing our [facility] Emergency Department.

## 2018-11-29 NOTE — ED TRIAGE NOTES
Patient having urinary burning, frequency since Friday. Was seen doctors care, was given Cipro for UTI, states symptoms are not getting better.

## 2018-12-01 LAB
C TRACH RRNA SPEC QL NAA+PROBE: NEGATIVE
N GONORRHOEA RRNA SPEC QL NAA+PROBE: NEGATIVE
SPECIMEN SOURCE: NORMAL

## 2018-12-02 LAB
BACTERIA SPEC CULT: NORMAL
SERVICE CMNT-IMP: NORMAL

## 2020-06-14 LAB
BASOPHILS # BLD: 0 K/UL (ref 0–0.2)
BASOPHILS NFR BLD: 0 % (ref 0–2)
DIFFERENTIAL METHOD BLD: NORMAL
EOSINOPHIL # BLD: 0.6 K/UL (ref 0–0.8)
EOSINOPHIL NFR BLD: 6 % (ref 0.5–7.8)
ERYTHROCYTE [DISTWIDTH] IN BLOOD BY AUTOMATED COUNT: 12.5 % (ref 11.9–14.6)
HCT VFR BLD AUTO: 39.1 % (ref 35.8–46.3)
HGB BLD-MCNC: 13.4 G/DL (ref 11.7–15.4)
IMM GRANULOCYTES # BLD AUTO: 0 K/UL (ref 0–0.5)
IMM GRANULOCYTES NFR BLD AUTO: 0 % (ref 0–5)
LYMPHOCYTES # BLD: 3.1 K/UL (ref 0.5–4.6)
LYMPHOCYTES NFR BLD: 34 % (ref 13–44)
MCH RBC QN AUTO: 31.3 PG (ref 26.1–32.9)
MCHC RBC AUTO-ENTMCNC: 34.3 G/DL (ref 31.4–35)
MCV RBC AUTO: 91.4 FL (ref 79.6–97.8)
MONOCYTES # BLD: 0.5 K/UL (ref 0.1–1.3)
MONOCYTES NFR BLD: 6 % (ref 4–12)
NEUTS SEG # BLD: 4.8 K/UL (ref 1.7–8.2)
NEUTS SEG NFR BLD: 53 % (ref 43–78)
NRBC # BLD: 0 K/UL (ref 0–0.2)
PLATELET # BLD AUTO: 235 K/UL (ref 150–450)
PMV BLD AUTO: 10.6 FL (ref 9.4–12.3)
RBC # BLD AUTO: 4.28 M/UL (ref 4.05–5.2)
WBC # BLD AUTO: 9.1 K/UL (ref 4.3–11.1)

## 2020-06-14 PROCEDURE — 84484 ASSAY OF TROPONIN QUANT: CPT

## 2020-06-14 PROCEDURE — 96374 THER/PROPH/DIAG INJ IV PUSH: CPT

## 2020-06-14 PROCEDURE — 85025 COMPLETE CBC W/AUTO DIFF WBC: CPT

## 2020-06-14 PROCEDURE — 81003 URINALYSIS AUTO W/O SCOPE: CPT

## 2020-06-14 PROCEDURE — 93005 ELECTROCARDIOGRAM TRACING: CPT | Performed by: EMERGENCY MEDICINE

## 2020-06-14 PROCEDURE — 83690 ASSAY OF LIPASE: CPT

## 2020-06-14 PROCEDURE — 80074 ACUTE HEPATITIS PANEL: CPT

## 2020-06-14 PROCEDURE — 80307 DRUG TEST PRSMV CHEM ANLYZR: CPT

## 2020-06-14 PROCEDURE — 80053 COMPREHEN METABOLIC PANEL: CPT

## 2020-06-14 PROCEDURE — 96375 TX/PRO/DX INJ NEW DRUG ADDON: CPT

## 2020-06-14 PROCEDURE — 99285 EMERGENCY DEPT VISIT HI MDM: CPT

## 2020-06-15 ENCOUNTER — HOSPITAL ENCOUNTER (EMERGENCY)
Age: 35
Discharge: HOME | End: 2020-06-15
Attending: EMERGENCY MEDICINE
Payer: COMMERCIAL

## 2020-06-15 ENCOUNTER — APPOINTMENT (OUTPATIENT)
Dept: ULTRASOUND IMAGING | Age: 35
End: 2020-06-15
Attending: EMERGENCY MEDICINE
Payer: COMMERCIAL

## 2020-06-15 VITALS
HEIGHT: 63 IN | RESPIRATION RATE: 18 BRPM | SYSTOLIC BLOOD PRESSURE: 129 MMHG | HEART RATE: 99 BPM | OXYGEN SATURATION: 100 % | DIASTOLIC BLOOD PRESSURE: 62 MMHG | WEIGHT: 200 LBS | TEMPERATURE: 98.7 F | BODY MASS INDEX: 35.44 KG/M2

## 2020-06-15 DIAGNOSIS — K80.00 CALCULUS OF GALLBLADDER WITH ACUTE CHOLECYSTITIS WITHOUT OBSTRUCTION: Primary | ICD-10-CM

## 2020-06-15 LAB
ALBUMIN SERPL-MCNC: 3.9 G/DL (ref 3.5–5)
ALBUMIN/GLOB SERPL: 1 {RATIO} (ref 1.2–3.5)
ALP SERPL-CCNC: 134 U/L (ref 50–136)
ALT SERPL-CCNC: 316 U/L (ref 12–65)
AMPHET UR QL SCN: NEGATIVE
ANION GAP SERPL CALC-SCNC: 9 MMOL/L (ref 7–16)
APAP SERPL-MCNC: <30 UG/ML (ref 10–30)
AST SERPL-CCNC: 232 U/L (ref 15–37)
ATRIAL RATE: 106 BPM
BARBITURATES UR QL SCN: NEGATIVE
BENZODIAZ UR QL: NEGATIVE
BILIRUB SERPL-MCNC: 0.8 MG/DL (ref 0.2–1.1)
BUN SERPL-MCNC: 17 MG/DL (ref 6–23)
CALCIUM SERPL-MCNC: 8.9 MG/DL (ref 8.3–10.4)
CALCULATED P AXIS, ECG09: 67 DEGREES
CALCULATED R AXIS, ECG10: 47 DEGREES
CALCULATED T AXIS, ECG11: 53 DEGREES
CANNABINOIDS UR QL SCN: NEGATIVE
CHLORIDE SERPL-SCNC: 110 MMOL/L (ref 98–107)
CO2 SERPL-SCNC: 20 MMOL/L (ref 21–32)
COCAINE UR QL SCN: NEGATIVE
CREAT SERPL-MCNC: 0.83 MG/DL (ref 0.6–1)
DIAGNOSIS, 93000: NORMAL
ETHANOL SERPL-MCNC: <3 MG/DL
GLOBULIN SER CALC-MCNC: 3.9 G/DL (ref 2.3–3.5)
GLUCOSE SERPL-MCNC: 121 MG/DL (ref 65–100)
HCG UR QL: NEGATIVE
LIPASE SERPL-CCNC: 107 U/L (ref 73–393)
METHADONE UR QL: NEGATIVE
OPIATES UR QL: POSITIVE
P-R INTERVAL, ECG05: 156 MS
PCP UR QL: NEGATIVE
POTASSIUM SERPL-SCNC: 3.3 MMOL/L (ref 3.5–5.1)
PROT SERPL-MCNC: 7.8 G/DL (ref 6.3–8.2)
Q-T INTERVAL, ECG07: 334 MS
QRS DURATION, ECG06: 70 MS
QTC CALCULATION (BEZET), ECG08: 443 MS
SODIUM SERPL-SCNC: 139 MMOL/L (ref 136–145)
TROPONIN-HIGH SENSITIVITY: 3.1 PG/ML (ref 0–14)
VENTRICULAR RATE, ECG03: 106 BPM

## 2020-06-15 PROCEDURE — 81025 URINE PREGNANCY TEST: CPT

## 2020-06-15 PROCEDURE — 76705 ECHO EXAM OF ABDOMEN: CPT

## 2020-06-15 PROCEDURE — 74011250636 HC RX REV CODE- 250/636: Performed by: EMERGENCY MEDICINE

## 2020-06-15 PROCEDURE — 96375 TX/PRO/DX INJ NEW DRUG ADDON: CPT

## 2020-06-15 PROCEDURE — 96374 THER/PROPH/DIAG INJ IV PUSH: CPT

## 2020-06-15 RX ORDER — MORPHINE SULFATE 4 MG/ML
4 INJECTION INTRAVENOUS
Status: COMPLETED | OUTPATIENT
Start: 2020-06-15 | End: 2020-06-15

## 2020-06-15 RX ORDER — HYDROCODONE BITARTRATE AND ACETAMINOPHEN 7.5; 325 MG/1; MG/1
1 TABLET ORAL
Qty: 17 TAB | Refills: 0 | Status: SHIPPED | OUTPATIENT
Start: 2020-06-15 | End: 2020-06-20

## 2020-06-15 RX ORDER — ONDANSETRON 2 MG/ML
4 INJECTION INTRAMUSCULAR; INTRAVENOUS
Status: COMPLETED | OUTPATIENT
Start: 2020-06-15 | End: 2020-06-15

## 2020-06-15 RX ORDER — ONDANSETRON 4 MG/1
4 TABLET, FILM COATED ORAL
Qty: 15 TAB | Refills: 0 | Status: ON HOLD | OUTPATIENT
Start: 2020-06-15 | End: 2020-07-12 | Stop reason: ALTCHOICE

## 2020-06-15 RX ADMIN — MORPHINE SULFATE 4 MG: 4 INJECTION INTRAVENOUS at 01:30

## 2020-06-15 RX ADMIN — ONDANSETRON 4 MG: 2 INJECTION INTRAMUSCULAR; INTRAVENOUS at 01:30

## 2020-06-15 RX ADMIN — SODIUM CHLORIDE 1000 ML: 9 INJECTION, SOLUTION INTRAVENOUS at 01:29
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
## 2020-06-15 NOTE — ED NOTES
I have reviewed discharge instructions with the patient. The patient verbalized understanding. Patient left ED via Discharge Method: ambulatory to Home with . Opportunity for questions and clarification provided. Patient given 2 scripts. To continue your aftercare when you leave the hospital, you may receive an automated call from our care team to check in on how you are doing. This is a free service and part of our promise to provide the best care and service to meet your aftercare needs.  If you have questions, or wish to unsubscribe from this service please call [phone]. Thank you for Choosing our [address] [place] [address] Emergency Department.

## 2020-06-15 NOTE — DISCHARGE INSTRUCTIONS
Take medications as directed  Stay on clear liquids and bland foods only    It is imperative that you call Dr. Jeanine Martin office at [phone] later this morning.   Please inform them that he would like to see you in his office on Tuesday, 6/16/2020    Do not drink alcohol or drive while taking the prescription pain medications  Call your doctor/follow up doctor to set up appointment for recheck visit  Return to ER for any worsening symptoms or new problems which may arise

## 2020-06-15 NOTE — ED TRIAGE NOTES
Pt came in c/o CP, rib pain, back pain, stomach pain , nausea, vomiting x 2 weeks \" on and off\" stated it normally lasts 20 mins but not today. Pt stated she went to her PCP, and he referred her to a GI doctor but has been waiting for an appt. Pt denied a fever, cough, dyspnea. Pt stated she has a hx of a heart attack but could not provide specific information in regards to the event.

## 2020-06-15 NOTE — LETTER
[facility] EMERGENCY DEPT 
[address] [facility] 
[phone] Work/School Note Date: 6/14/2020 To Whom It May concern: 
 
Diane Moss accompanied by her  Christine Payton was seen and treated today in the emergency room by the following provider(s): 
Attending Provider: Melissa Latham MD. Diane Moss Return to work after evaluation from  surgeon.  
 
Sincerely, 
 
 
 
 
Kelsy Scott RN

## 2020-06-16 ENCOUNTER — HOSPITAL ENCOUNTER (OUTPATIENT)
Dept: SURGERY | Age: 35
Discharge: HOME | End: 2020-06-16

## 2020-06-16 ENCOUNTER — ANESTHESIA EVENT (OUTPATIENT)
Dept: SURGERY | Age: 35
End: 2020-06-16
Payer: COMMERCIAL

## 2020-06-16 ENCOUNTER — PATIENT OUTREACH (OUTPATIENT)
Dept: CASE MANAGEMENT | Age: 35
End: 2020-06-16

## 2020-06-16 VITALS — WEIGHT: 200 LBS | HEIGHT: 63 IN | BODY MASS INDEX: 35.44 KG/M2

## 2020-06-16 LAB
HAV IGM SERPL QL IA: NEGATIVE
HBV CORE IGM SERPL QL IA: NEGATIVE
HBV SURFACE AG SERPL QL IA: NEGATIVE
HCV AB S/CO SERPL IA: <0.1 S/CO RATIO (ref 0–0.9)

## 2020-06-16 NOTE — PERIOP NOTES
Patient verified name and . Order for consent not found in EHR     Type 2 surgery, PAT phone assessment complete. Orders not received. Labs per surgeon: None  Labs per anesthesia protocol: Hgb to be drawn DOS      Patient answered medical/surgical history questions at their best of ability. All prior to admission medications documented in [facility]. Patient instructed to take the following medications the day of surgery according to anesthesia guidelines with a small sip of water: USe inhaler and Zofran if neededHold all vitamins 7 days prior to surgery and NSAIDS 5 days prior to surgery. Prescription meds to hold:None Do not take Norco within 12-24 hours prior to arrival to hospital DOS    Patient instructed on the following:  >A negative Covid swab result is required to proceed with surgery; the swab will be collected 7 days prior to surgery at the [facility] at [address]. The patient will be contacted by the Covid swab team for an appointment date and time. For questions or concerns the patient should call [phone]. The clinic is closed from - for lunch and on weekends. Appointment date/time not found in EHR and provided to patient. Patient added onto surgery schedule  > 1 visitor allowed at this time. >Arrive at [facility], time of arrival to be called the day before by 1700  >NPO after 1000 DOS and clear liquids only after midnight  >Responsible adult must drive patient to the hospital, stay during surgery, and patient will need supervision 24 hours after anesthesia  >Use antibacterial soap in shower the night before surgery and on the morning of surgery  >All piercings must be removed prior to arrival.    >Leave all valuables (money and jewelry) at home but bring insurance card and ID on       DOS. >Do not wear make-up, nail polish, lotions, cologne, perfumes, powders, or oil on skin.     Patient teach back successful and patient demonstrates knowledge of instruction.

## 2020-06-16 NOTE — PROGRESS NOTES
Attempted outreach # 1 to patient for ANAYA BURRELL for ER visit on 6/15/2020 for chest pain and acute cholecystitis   UTR at first number listed and 2nd number is invalid/disconnected  Per connect care patient has apt with gen surgeon today  PLAN:  Will attempt outreach # 2 tomorrow
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
## 2020-06-17 ENCOUNTER — ANESTHESIA (OUTPATIENT)
Dept: SURGERY | Age: 35
End: 2020-06-17
Payer: COMMERCIAL

## 2020-06-17 ENCOUNTER — HOSPITAL ENCOUNTER (OUTPATIENT)
Age: 35
Setting detail: OUTPATIENT SURGERY
Discharge: HOME | End: 2020-06-17
Attending: SURGERY | Admitting: SURGERY
Payer: COMMERCIAL

## 2020-06-17 ENCOUNTER — PATIENT OUTREACH (OUTPATIENT)
Dept: CASE MANAGEMENT | Age: 35
End: 2020-06-17

## 2020-06-17 VITALS
HEART RATE: 65 BPM | OXYGEN SATURATION: 98 % | HEIGHT: 63 IN | TEMPERATURE: 99.5 F | RESPIRATION RATE: 17 BRPM | DIASTOLIC BLOOD PRESSURE: 73 MMHG | SYSTOLIC BLOOD PRESSURE: 139 MMHG | BODY MASS INDEX: 35.9 KG/M2 | WEIGHT: 202.6 LBS

## 2020-06-17 DIAGNOSIS — K80.10 CALCULUS OF GALLBLADDER WITH CHRONIC CHOLECYSTITIS WITHOUT OBSTRUCTION: Primary | ICD-10-CM

## 2020-06-17 PROCEDURE — 74011250637 HC RX REV CODE- 250/637: Performed by: ANESTHESIOLOGY

## 2020-06-17 PROCEDURE — 74011250636 HC RX REV CODE- 250/636

## 2020-06-17 PROCEDURE — 74011000250 HC RX REV CODE- 250: Performed by: SURGERY

## 2020-06-17 PROCEDURE — 77030010507 HC ADH SKN DERMBND J&J -B: Performed by: SURGERY

## 2020-06-17 PROCEDURE — 88304 TISSUE EXAM BY PATHOLOGIST: CPT

## 2020-06-17 PROCEDURE — 74011250636 HC RX REV CODE- 250/636: Performed by: ANESTHESIOLOGY

## 2020-06-17 PROCEDURE — 76210000020 HC REC RM PH II FIRST 0.5 HR: Performed by: SURGERY

## 2020-06-17 PROCEDURE — 77030034154 HC SHR COAG HARM ACE J&J -F: Performed by: SURGERY

## 2020-06-17 PROCEDURE — 77030019908 HC STETH ESOPH SIMS -A: Performed by: ANESTHESIOLOGY

## 2020-06-17 PROCEDURE — 77030039425 HC BLD LARYNG TRULITE DISP TELE -A: Performed by: ANESTHESIOLOGY

## 2020-06-17 PROCEDURE — 77030008606 HC TRCR ENDOSC KII AMR -B: Performed by: SURGERY

## 2020-06-17 PROCEDURE — 76060000032 HC ANESTHESIA 0.5 TO 1 HR: Performed by: SURGERY

## 2020-06-17 PROCEDURE — 76210000006 HC OR PH I REC 0.5 TO 1 HR: Performed by: SURGERY

## 2020-06-17 PROCEDURE — 74011000250 HC RX REV CODE- 250

## 2020-06-17 PROCEDURE — 77030021158 HC TRCR BLN GELPRT AMR -B: Performed by: SURGERY

## 2020-06-17 PROCEDURE — 74011250636 HC RX REV CODE- 250/636: Performed by: SURGERY

## 2020-06-17 PROCEDURE — 77030010513 HC APPL CLP LIG J&J -C: Performed by: SURGERY

## 2020-06-17 PROCEDURE — 77030040922 HC BLNKT HYPOTHRM STRY -A: Performed by: ANESTHESIOLOGY

## 2020-06-17 PROCEDURE — 77030018836 HC SOL IRR NACL ICUM -A: Performed by: SURGERY

## 2020-06-17 PROCEDURE — 77030008522 HC TBNG INSUF LAPRO STRY -B: Performed by: SURGERY

## 2020-06-17 PROCEDURE — 77030037088 HC TUBE ENDOTRACH ORAL NSL COVD-A: Performed by: ANESTHESIOLOGY

## 2020-06-17 PROCEDURE — 74011000250 HC RX REV CODE- 250: Performed by: ANESTHESIOLOGY

## 2020-06-17 PROCEDURE — 77030002933 HC SUT MCRYL J&J -A: Performed by: SURGERY

## 2020-06-17 PROCEDURE — 76010000160 HC OR TIME 0.5 TO 1 HR INTENSV-TIER 1: Performed by: SURGERY

## 2020-06-17 PROCEDURE — 77030031139 HC SUT VCRL2 J&J -A: Performed by: SURGERY

## 2020-06-17 PROCEDURE — 77030007955 HC PCH ENDOSC SPEC J&J -B: Performed by: SURGERY

## 2020-06-17 PROCEDURE — 77030040361 HC SLV COMPR DVT MDII -B: Performed by: SURGERY

## 2020-06-17 PROCEDURE — 77030008756 HC TU IRR SUC STRY -B: Performed by: SURGERY

## 2020-06-17 PROCEDURE — 77030020829: Performed by: SURGERY

## 2020-06-17 RX ORDER — HYDROCODONE BITARTRATE AND ACETAMINOPHEN 5; 325 MG/1; MG/1
2 TABLET ORAL AS NEEDED
Status: DISCONTINUED | OUTPATIENT
Start: 2020-06-17 | End: 2020-06-17 | Stop reason: HOSPADM

## 2020-06-17 RX ORDER — LIDOCAINE HYDROCHLORIDE 20 MG/ML
INJECTION, SOLUTION EPIDURAL; INFILTRATION; INTRACAUDAL; PERINEURAL AS NEEDED
Status: DISCONTINUED | OUTPATIENT
Start: 2020-06-17 | End: 2020-06-17 | Stop reason: HOSPADM

## 2020-06-17 RX ORDER — MIDAZOLAM HYDROCHLORIDE 1 MG/ML
2 INJECTION, SOLUTION INTRAMUSCULAR; INTRAVENOUS
Status: COMPLETED | OUTPATIENT
Start: 2020-06-17 | End: 2020-06-17

## 2020-06-17 RX ORDER — MIDAZOLAM HYDROCHLORIDE 1 MG/ML
5 INJECTION, SOLUTION INTRAMUSCULAR; INTRAVENOUS ONCE
Status: DISCONTINUED | OUTPATIENT
Start: 2020-06-17 | End: 2020-06-17 | Stop reason: HOSPADM

## 2020-06-17 RX ORDER — AMLODIPINE BESYLATE 5 MG/1
5 TABLET ORAL DAILY
COMMUNITY

## 2020-06-17 RX ORDER — BUPIVACAINE HYDROCHLORIDE 5 MG/ML
INJECTION, SOLUTION EPIDURAL; INTRACAUDAL AS NEEDED
Status: DISCONTINUED | OUTPATIENT
Start: 2020-06-17 | End: 2020-06-17 | Stop reason: HOSPADM

## 2020-06-17 RX ORDER — HYDROCODONE BITARTRATE AND ACETAMINOPHEN 5; 325 MG/1; MG/1
1-2 TABLET ORAL
Qty: 30 TAB | Refills: 0 | Status: SHIPPED | OUTPATIENT
Start: 2020-06-17 | End: 2020-06-20

## 2020-06-17 RX ORDER — CEFAZOLIN SODIUM/WATER 2 G/20 ML
2 SYRINGE (ML) INTRAVENOUS
Status: COMPLETED | OUTPATIENT
Start: 2020-06-17 | End: 2020-06-17

## 2020-06-17 RX ORDER — PROPOFOL 10 MG/ML
INJECTION, EMULSION INTRAVENOUS AS NEEDED
Status: DISCONTINUED | OUTPATIENT
Start: 2020-06-17 | End: 2020-06-17 | Stop reason: HOSPADM

## 2020-06-17 RX ORDER — SODIUM CHLORIDE, SODIUM LACTATE, POTASSIUM CHLORIDE, CALCIUM CHLORIDE 600; 310; 30; 20 MG/100ML; MG/100ML; MG/100ML; MG/100ML
75 INJECTION, SOLUTION INTRAVENOUS CONTINUOUS
Status: DISCONTINUED | OUTPATIENT
Start: 2020-06-17 | End: 2020-06-17 | Stop reason: HOSPADM

## 2020-06-17 RX ORDER — OXYCODONE HYDROCHLORIDE 5 MG/1
5 TABLET ORAL
Status: DISCONTINUED | OUTPATIENT
Start: 2020-06-17 | End: 2020-06-17 | Stop reason: HOSPADM

## 2020-06-17 RX ORDER — FENTANYL CITRATE 50 UG/ML
INJECTION, SOLUTION INTRAMUSCULAR; INTRAVENOUS AS NEEDED
Status: DISCONTINUED | OUTPATIENT
Start: 2020-06-17 | End: 2020-06-17 | Stop reason: HOSPADM

## 2020-06-17 RX ORDER — ROCURONIUM BROMIDE 10 MG/ML
INJECTION, SOLUTION INTRAVENOUS AS NEEDED
Status: DISCONTINUED | OUTPATIENT
Start: 2020-06-17 | End: 2020-06-17 | Stop reason: HOSPADM

## 2020-06-17 RX ORDER — NEOSTIGMINE METHYLSULFATE 1 MG/ML
INJECTION, SOLUTION INTRAVENOUS AS NEEDED
Status: DISCONTINUED | OUTPATIENT
Start: 2020-06-17 | End: 2020-06-17 | Stop reason: HOSPADM

## 2020-06-17 RX ORDER — LIDOCAINE HYDROCHLORIDE 10 MG/ML
0.1 INJECTION INFILTRATION; PERINEURAL AS NEEDED
Status: DISCONTINUED | OUTPATIENT
Start: 2020-06-17 | End: 2020-06-17 | Stop reason: HOSPADM

## 2020-06-17 RX ORDER — ONDANSETRON 2 MG/ML
4 INJECTION INTRAMUSCULAR; INTRAVENOUS ONCE
Status: COMPLETED | OUTPATIENT
Start: 2020-06-17 | End: 2020-06-17

## 2020-06-17 RX ORDER — GLYCOPYRROLATE 0.2 MG/ML
INJECTION INTRAMUSCULAR; INTRAVENOUS AS NEEDED
Status: DISCONTINUED | OUTPATIENT
Start: 2020-06-17 | End: 2020-06-17 | Stop reason: HOSPADM

## 2020-06-17 RX ORDER — FENTANYL CITRATE 50 UG/ML
100 INJECTION, SOLUTION INTRAMUSCULAR; INTRAVENOUS ONCE
Status: DISCONTINUED | OUTPATIENT
Start: 2020-06-17 | End: 2020-06-17 | Stop reason: HOSPADM

## 2020-06-17 RX ORDER — EPHEDRINE SULFATE/0.9% NACL/PF 50 MG/5 ML
SYRINGE (ML) INTRAVENOUS AS NEEDED
Status: DISCONTINUED | OUTPATIENT
Start: 2020-06-17 | End: 2020-06-17 | Stop reason: HOSPADM

## 2020-06-17 RX ORDER — HYDROMORPHONE HYDROCHLORIDE 2 MG/ML
0.5 INJECTION, SOLUTION INTRAMUSCULAR; INTRAVENOUS; SUBCUTANEOUS
Status: DISCONTINUED | OUTPATIENT
Start: 2020-06-17 | End: 2020-06-17 | Stop reason: HOSPADM

## 2020-06-17 RX ADMIN — GLYCOPYRROLATE 0.8 MG: 0.2 INJECTION, SOLUTION INTRAMUSCULAR; INTRAVENOUS at 16:09

## 2020-06-17 RX ADMIN — Medication 10 MG: at 15:45

## 2020-06-17 RX ADMIN — LIDOCAINE HYDROCHLORIDE 60 MG: 20 INJECTION, SOLUTION EPIDURAL; INFILTRATION; INTRACAUDAL; PERINEURAL at 15:32

## 2020-06-17 RX ADMIN — HYDROMORPHONE HYDROCHLORIDE 0.5 MG: 2 INJECTION INTRAMUSCULAR; INTRAVENOUS; SUBCUTANEOUS at 16:37

## 2020-06-17 RX ADMIN — Medication 5 MG: at 16:09

## 2020-06-17 RX ADMIN — SODIUM CHLORIDE, SODIUM LACTATE, POTASSIUM CHLORIDE, AND CALCIUM CHLORIDE 75 ML/HR: 600; 310; 30; 20 INJECTION, SOLUTION INTRAVENOUS at 13:54

## 2020-06-17 RX ADMIN — MIDAZOLAM 2 MG: 1 INJECTION INTRAMUSCULAR; INTRAVENOUS at 14:46

## 2020-06-17 RX ADMIN — PROPOFOL 150 MG: 10 INJECTION, EMULSION INTRAVENOUS at 15:32

## 2020-06-17 RX ADMIN — Medication 2 G: at 15:40

## 2020-06-17 RX ADMIN — SODIUM CHLORIDE, SODIUM LACTATE, POTASSIUM CHLORIDE, AND CALCIUM CHLORIDE: 600; 310; 30; 20 INJECTION, SOLUTION INTRAVENOUS at 16:16

## 2020-06-17 RX ADMIN — SODIUM CHLORIDE, SODIUM LACTATE, POTASSIUM CHLORIDE, AND CALCIUM CHLORIDE: 600; 310; 30; 20 INJECTION, SOLUTION INTRAVENOUS at 15:56

## 2020-06-17 RX ADMIN — ONDANSETRON 4 MG: 2 INJECTION INTRAMUSCULAR; INTRAVENOUS at 17:04

## 2020-06-17 RX ADMIN — FENTANYL CITRATE 100 MCG: 50 INJECTION INTRAMUSCULAR; INTRAVENOUS at 15:27

## 2020-06-17 RX ADMIN — ROCURONIUM BROMIDE 50 MG: 10 INJECTION, SOLUTION INTRAVENOUS at 15:32

## 2020-06-17 RX ADMIN — FENTANYL CITRATE 50 MCG: 50 INJECTION INTRAMUSCULAR; INTRAVENOUS at 15:55

## 2020-06-17 RX ADMIN — LIDOCAINE HYDROCHLORIDE 0.1 ML: 10 INJECTION, SOLUTION INFILTRATION; PERINEURAL at 13:55

## 2020-06-17 RX ADMIN — FENTANYL CITRATE 50 MCG: 50 INJECTION INTRAMUSCULAR; INTRAVENOUS at 15:52

## 2020-06-17 RX ADMIN — HYDROCODONE BITARTRATE AND ACETAMINOPHEN 2 TABLET: 5; 325 TABLET ORAL at 16:48

## 2020-06-17 RX ADMIN — HYDROMORPHONE HYDROCHLORIDE 0.5 MG: 2 INJECTION INTRAMUSCULAR; INTRAVENOUS; SUBCUTANEOUS at 16:42

## 2020-06-17 NOTE — ANESTHESIA PROCEDURE NOTES
Central Line Placement Performed by: Oneida Swartz MD 
Authorized by: Oneida Swartz MD  
 
Indications: vascular access, central pressure monitoring and need for vasopressors Preanesthetic Checklist: patient identified, risks and benefits discussed, anesthesia consent, patient being monitored and timeout performed Pre-procedure: All elements of maximal sterile barrier technique followed? Yes   
2% Chlorhexidine for cutaneous antisepsis, Hand hygiene performed prior to catheter insertion and Ultrasound guidance Sterile Ultrasound Technique followed?: Yes Ultrasound Image Stored? Image stored (Potential access site(s) were examined with ultrasound and an acceptable patent access site was selected. The needle path and vein access were visualized in realtime using ultrasound. An image of the wire in the vessel was printed and placed in chart.) Procedure:  
Prep:  Chlorhexidine Location: internal jugular Orientation:  Right Patient position:  Trendelenburg Catheter type:  Double lumen Catheter size:  9 Fr Catheter length:  12 cm Number of attempts:  1 Successful placement: Yes Assessment:  
Post-procedure:  Catheter secured and sterile dressing applied Assessment:  Blood return through all ports, free fluid flow and guidewire removal verified (Guidewire removed intact.) Insertion:  Uncomplicated Patient tolerance:  Patient tolerated the procedure well with no immediate complications Internal Jugular CVC Right internal jugular CVC: Risks, benefits, alternatives explained and pt agrees to proceed. Pt positioned in Trendelenburg. Sterile prep with chlorhexidine and full body drape. Right internal jugular vein on first stick with real time ultrasound guidance. Veinous cannulation confirmed with manometry and visualization of wire in vein on ultrasound. Ultrasound image printed and placed on chart.   Easy introducer and PAC insertion to  2 centimeters. Sterile tegaderm applied. No complications. CXR to be performed in ICU. Seven step protocol followed.

## 2020-06-17 NOTE — PERIOP NOTES
[address] 35 June 17, 2020       RE: Joshua Correa      To Whom It May Concern,    This is to certify that Joshua Correa should be excused from school/work 6/17/20 and until her surgeon releases her to return to normal activities. Please feel free to contact Dr. Parsons [place] office if you have any questions or concerns. Thank you for your assistance in this matter.       Sincerely,

## 2020-06-17 NOTE — DISCHARGE INSTRUCTIONS
1. Diet as tolerated except for a  low fat diet after laparoscopic cholecystectomy. 2. Showering is allowed, but no tub baths, hot tubs or swimming. 3. Drainage is common from the wounds. Change the dressings as needed. Call our office if the wounds become reddened, tender, feel warm to the touch or pus starts to drain from the wound. 4. Take prescribed pain medication as directed, usually Percocet, Norco, Ultram or Dilaudid. Take over the counter medication for minor pain. 5. Ice may be applied intermittently to the surgical site or sites. 6. Call or office, [phone], if problems arise. 7. Follow up in the office at the assigned time. 8. Resume all medications as taken per surgery, unless specifically instructed not to take certain ones. 9. No lifting more than 25 pounds until told otherwise. 10. Driving is allowed 3 days after surgery as long as you feel comfortable enough to drive and have not taken any prescription pain medication prior to driving. After general anesthesia or intravenous sedation, for 24 hours or while taking prescription Narcotics:  · Limit your activities  · A responsible adult needs to be with you for the next 24 hours  · Do not drive and operate hazardous machinery  · Do not make important personal or business decisions  · Do not drink alcoholic beverages  · If you have not urinated within 8 hours after discharge, please contact your surgeon on call. · If you have sleep apnea and have a CPAP machine, please use it for all naps and sleeping. · Please use caution when taking narcotics and any of your home medications that may cause drowsiness. *  Please give a list of your current medications to your Primary Care Provider. *  Please update this list whenever your medications are discontinued, doses are      changed, or new medications (including over-the-counter products) are added.   *  Please carry medication information at all times in case of emergency situations. These are general instructions for a healthy lifestyle:  No smoking/ No tobacco products/ Avoid exposure to second hand smoke  Surgeon General's Warning:  Quitting smoking now greatly reduces serious risk to your health. Obesity, smoking, and sedentary lifestyle greatly increases your risk for illness  A healthy diet, regular physical exercise & weight monitoring are important for maintaining a healthy lifestyle    You may be retaining fluid if you have a history of heart failure or if you experience any of the following symptoms:  Weight gain of 3 pounds or more overnight or 5 pounds in a week, increased swelling in our hands or feet or shortness of breath while lying flat in bed. Please call your doctor as soon as you notice any of these symptoms; do not wait until your next office visit.

## 2020-06-17 NOTE — PROGRESS NOTES
Attempted outreach # 2 to patient for ANAYA BURRELL for ER visit on 6/15/2020 for chest pain and acute cholecystitis   UTR at this time due to patient has been direct admit to hospital   Per [facility] patient had apt with gen surgeon on 6/16/2020  Per [facility] patient has been direct  admit to hospital today for surgery  PLAN:  Case closed at this time due to patient has been admitted to hospital and will be reassigned for ANAYA BURRELL when discharged from hospital to home

## 2020-06-17 NOTE — ANESTHESIA POSTPROCEDURE EVALUATION
Procedure(s):  CHOLECYSTECTOMY LAPAROSCOPIC.     general    Anesthesia Post Evaluation        Patient location during evaluation: PACU  Patient participation: complete - patient participated  Level of consciousness: awake  Pain management: satisfactory to patient  Airway patency: patent  Anesthetic complications: no  Cardiovascular status: hemodynamically stable  Respiratory status: spontaneous ventilation  Hydration status: euvolemic  Post anesthesia nausea and vomiting:  none      INITIAL Post-op Vital signs:   Vitals Value Taken Time   /73 6/17/2020  5:10 PM   Temp 37.5 °C (99.5 °F) 6/17/2020  4:25 PM   Pulse 65 6/17/2020  5:10 PM   Resp 17 6/17/2020  5:10 PM   SpO2 98 % 6/17/2020  5:10 PM

## 2020-06-17 NOTE — OP NOTES
Cholecystectomy Op Note Template Note     Indications: The patient was admitted to the hospital with cholelithiasis/cholecystitis. The patient now presents for a laparoscopic, possible open, cholecystectomy after discussing therapeutic alternatives. Pre-Op Diagnosis: Nausea and vomiting, intractability of vomiting not specified, unspecified vomiting type [R11.2]  Calculus of gall bladder and bile duct with nonacute cholecystitis with obstruction [K80.61]    Post-Op Diagnosis:  Nausea and vomiting, intractability of vomiting not specified,     Procedure: LAPAROSCOPIC CHOLECYSTECTOMY      Surgeon: Dario Irvin MD    Anesthesia:  General plus local         Procedure Details     The patient was brought to the operating room and placed supine. After induction of a general anesthetic, the abdomen was prepped and draped in standard fashion. An incision was made in the umbilicus and a Ernst trocar was placed in the usual fashion after which  the abdomen was insufflated to 15mmHg sterile CO2. The other trocars were then placed under direct vision. These were positioned four fingerbreadths below the right costal margin in the anterior axillary line and mid clavicular line and just to the right of the falciform ligament in the epigatrium. The gallbladder was grasped. Omental adhesions were taken down. The area of Calot's triangle was dissected with the cystic duct and cystic artery being exposed. Once these two structures had been identified the gallbladder graspers were repositioned, so that one was on the liver/gallbladder junction and a second on the fundus of the gallbladder. The gallbladder was  from the liver with the use of the Harmonic scalpel. The dissection was taken down to the cystic artery which was divided between four 5 mm clips. The cystic duct was divided between four 5 mm clips.  The gallbladder was free of all attachments and was removed from the abdomen via the umbilical incision site. The gallbladder was sent to pathology for evaluation at this point. The Ernst was returned to the umbilical incision site, the insufflation was restarted and the camera placed through the Ernst trocar. The gallbladder fossa was without evidence of bleeding, there was no bleeding from the cystic artery stump and there was no evidence of bile leak from the cystic duct stump. The three upper abdominal trocars were removed under the direct vision without bleeding from the trocar sites. The Ernst trocar was removed and the fascia of the umbilicus was closed with 0'0  Vicryl. The trocar sites were closed with surgical staples. A sterile dressing was applied. The patient was taken to the recovery room in good condition, having tolerated the procedure well. Instrument, sponge, and needle counts were correct prior to abdominal closure and at the conclusion of the case. Findings: Gallstones.     Estimated Blood Loss:     20 cc's         Specimens: Gallbladder       Signed: Brigida Faith MD

## 2020-06-17 NOTE — ANESTHESIA PREPROCEDURE EVALUATION
Relevant Problems   No relevant active problems       Anesthetic History          Comments: Aspiration pneumonia      Review of Systems / Medical History  Patient summary reviewed and pertinent labs reviewed    Pulmonary            Asthma : well controlled       Neuro/Psych   Within defined limits           Cardiovascular  Within defined limits                Exercise tolerance: >4 METS     GI/Hepatic/Renal  Within defined limits              Endo/Other        Morbid obesity     Other Findings              Physical Exam    Airway  Mallampati: II  TM Distance: 4 - 6 cm  Neck ROM: normal range of motion   Mouth opening: Normal     Cardiovascular  Regular rate and rhythm,  S1 and S2 normal,  no murmur, click, rub, or gallop             Dental         Pulmonary  Breath sounds clear to auscultation               Abdominal         Other Findings            Anesthetic Plan    ASA: 3  Anesthesia type: general          Induction: Intravenous  Anesthetic plan and risks discussed with: Patient

## 2020-06-17 NOTE — INTERVAL H&P NOTE
Update History & Physical 
 
The Patient's History and Physical of 6/16/20 was reviewed with the patient and I examined the patient. There was no change. The surgical site was confirmed by the patient and me. Plan:  The risk, benefits, expected outcome, and alternative to the recommended procedure have been discussed with the patient. Patient understands and wants to proceed with the procedure.  
 
Electronically signed by Denys Macedo MD on 6/17/2020 at 1:51 PM

## 2020-07-01 PROBLEM — Z90.49 S/P CHOLECYSTECTOMY: Status: ACTIVE | Noted: 2020-07-01

## 2020-07-11 ENCOUNTER — APPOINTMENT (OUTPATIENT)
Dept: ULTRASOUND IMAGING | Age: 35
DRG: 444 | End: 2020-07-11
Attending: EMERGENCY MEDICINE
Payer: COMMERCIAL

## 2020-07-11 ENCOUNTER — HOSPITAL ENCOUNTER (INPATIENT)
Age: 35
LOS: 3 days | Discharge: HOME | DRG: 444 | End: 2020-07-14
Attending: EMERGENCY MEDICINE | Admitting: FAMILY MEDICINE
Payer: COMMERCIAL

## 2020-07-11 DIAGNOSIS — K80.50 CHOLEDOCHOLITHIASIS: ICD-10-CM

## 2020-07-11 DIAGNOSIS — K85.10 ACUTE GALLSTONE PANCREATITIS: Primary | ICD-10-CM

## 2020-07-11 DIAGNOSIS — K85.10 ACUTE BILIARY PANCREATITIS WITHOUT INFECTION OR NECROSIS: ICD-10-CM

## 2020-07-11 PROBLEM — K85.90 PANCREATITIS: Status: ACTIVE | Noted: 2020-07-11

## 2020-07-11 PROBLEM — R74.01 TRANSAMINITIS: Status: ACTIVE | Noted: 2020-07-11

## 2020-07-11 LAB
ALBUMIN SERPL-MCNC: 3.9 G/DL (ref 3.5–5)
ALBUMIN/GLOB SERPL: 1 {RATIO} (ref 1.2–3.5)
ALP SERPL-CCNC: 192 U/L (ref 50–136)
ALT SERPL-CCNC: 521 U/L (ref 12–65)
ANION GAP SERPL CALC-SCNC: 8 MMOL/L (ref 7–16)
AST SERPL-CCNC: 520 U/L (ref 15–37)
BASOPHILS # BLD: 0 K/UL (ref 0–0.2)
BASOPHILS NFR BLD: 1 % (ref 0–2)
BILIRUB SERPL-MCNC: 1.7 MG/DL (ref 0.2–1.1)
BUN SERPL-MCNC: 13 MG/DL (ref 6–23)
CALCIUM SERPL-MCNC: 9.7 MG/DL (ref 8.3–10.4)
CHLORIDE SERPL-SCNC: 109 MMOL/L (ref 98–107)
CO2 SERPL-SCNC: 24 MMOL/L (ref 21–32)
CREAT SERPL-MCNC: 0.79 MG/DL (ref 0.6–1)
DIFFERENTIAL METHOD BLD: NORMAL
EOSINOPHIL # BLD: 0.3 K/UL (ref 0–0.8)
EOSINOPHIL NFR BLD: 3 % (ref 0.5–7.8)
ERYTHROCYTE [DISTWIDTH] IN BLOOD BY AUTOMATED COUNT: 12.8 % (ref 11.9–14.6)
GLOBULIN SER CALC-MCNC: 3.9 G/DL (ref 2.3–3.5)
GLUCOSE SERPL-MCNC: 99 MG/DL (ref 65–100)
HCG UR QL: NEGATIVE
HCT VFR BLD AUTO: 40.3 % (ref 35.8–46.3)
HGB BLD-MCNC: 13.1 G/DL (ref 11.7–15.4)
IMM GRANULOCYTES # BLD AUTO: 0 K/UL (ref 0–0.5)
IMM GRANULOCYTES NFR BLD AUTO: 0 % (ref 0–5)
LIPASE SERPL-CCNC: >1500 U/L (ref 73–393)
LYMPHOCYTES # BLD: 2.1 K/UL (ref 0.5–4.6)
LYMPHOCYTES NFR BLD: 24 % (ref 13–44)
MCH RBC QN AUTO: 30.3 PG (ref 26.1–32.9)
MCHC RBC AUTO-ENTMCNC: 32.5 G/DL (ref 31.4–35)
MCV RBC AUTO: 93.3 FL (ref 79.6–97.8)
MONOCYTES # BLD: 0.6 K/UL (ref 0.1–1.3)
MONOCYTES NFR BLD: 7 % (ref 4–12)
NEUTS SEG # BLD: 5.7 K/UL (ref 1.7–8.2)
NEUTS SEG NFR BLD: 65 % (ref 43–78)
NRBC # BLD: 0 K/UL (ref 0–0.2)
PLATELET # BLD AUTO: 215 K/UL (ref 150–450)
PMV BLD AUTO: 10.7 FL (ref 9.4–12.3)
POTASSIUM SERPL-SCNC: 3.6 MMOL/L (ref 3.5–5.1)
PROT SERPL-MCNC: 7.8 G/DL (ref 6.3–8.2)
RBC # BLD AUTO: 4.32 M/UL (ref 4.05–5.2)
SODIUM SERPL-SCNC: 141 MMOL/L (ref 136–145)
TROPONIN-HIGH SENSITIVITY: <3 PG/ML (ref 0–14)
WBC # BLD AUTO: 8.7 K/UL (ref 4.3–11.1)

## 2020-07-11 PROCEDURE — 93005 ELECTROCARDIOGRAM TRACING: CPT | Performed by: EMERGENCY MEDICINE

## 2020-07-11 PROCEDURE — 99284 EMERGENCY DEPT VISIT MOD MDM: CPT

## 2020-07-11 PROCEDURE — 80053 COMPREHEN METABOLIC PANEL: CPT

## 2020-07-11 PROCEDURE — 96361 HYDRATE IV INFUSION ADD-ON: CPT

## 2020-07-11 PROCEDURE — 83690 ASSAY OF LIPASE: CPT

## 2020-07-11 PROCEDURE — 96375 TX/PRO/DX INJ NEW DRUG ADDON: CPT

## 2020-07-11 PROCEDURE — 81025 URINE PREGNANCY TEST: CPT

## 2020-07-11 PROCEDURE — 74011250636 HC RX REV CODE- 250/636: Performed by: EMERGENCY MEDICINE

## 2020-07-11 PROCEDURE — 96374 THER/PROPH/DIAG INJ IV PUSH: CPT

## 2020-07-11 PROCEDURE — 65270000029 HC RM PRIVATE

## 2020-07-11 PROCEDURE — 84484 ASSAY OF TROPONIN QUANT: CPT

## 2020-07-11 PROCEDURE — 81003 URINALYSIS AUTO W/O SCOPE: CPT

## 2020-07-11 PROCEDURE — 76705 ECHO EXAM OF ABDOMEN: CPT

## 2020-07-11 PROCEDURE — 85025 COMPLETE CBC W/AUTO DIFF WBC: CPT

## 2020-07-11 RX ORDER — SODIUM CHLORIDE, SODIUM LACTATE, POTASSIUM CHLORIDE, CALCIUM CHLORIDE 600; 310; 30; 20 MG/100ML; MG/100ML; MG/100ML; MG/100ML
1000 INJECTION, SOLUTION INTRAVENOUS CONTINUOUS
Status: DISCONTINUED | OUTPATIENT
Start: 2020-07-11 | End: 2020-07-12

## 2020-07-11 RX ORDER — ONDANSETRON 2 MG/ML
4 INJECTION INTRAMUSCULAR; INTRAVENOUS
Status: COMPLETED | OUTPATIENT
Start: 2020-07-11 | End: 2020-07-11

## 2020-07-11 RX ORDER — SODIUM CHLORIDE, SODIUM LACTATE, POTASSIUM CHLORIDE, CALCIUM CHLORIDE 600; 310; 30; 20 MG/100ML; MG/100ML; MG/100ML; MG/100ML
150 INJECTION, SOLUTION INTRAVENOUS CONTINUOUS
Status: DISCONTINUED | OUTPATIENT
Start: 2020-07-11 | End: 2020-07-14 | Stop reason: HOSPADM

## 2020-07-11 RX ORDER — HYDROMORPHONE HYDROCHLORIDE 1 MG/ML
1 INJECTION, SOLUTION INTRAMUSCULAR; INTRAVENOUS; SUBCUTANEOUS
Status: COMPLETED | OUTPATIENT
Start: 2020-07-11 | End: 2020-07-11

## 2020-07-11 RX ADMIN — SODIUM CHLORIDE, SODIUM LACTATE, POTASSIUM CHLORIDE, AND CALCIUM CHLORIDE 1000 ML: 600; 310; 30; 20 INJECTION, SOLUTION INTRAVENOUS at 22:13

## 2020-07-11 RX ADMIN — ONDANSETRON 4 MG: 2 INJECTION INTRAMUSCULAR; INTRAVENOUS at 22:14

## 2020-07-11 RX ADMIN — HYDROMORPHONE HYDROCHLORIDE 1 MG: 1 INJECTION, SOLUTION INTRAMUSCULAR; INTRAVENOUS; SUBCUTANEOUS at 22:14

## 2020-07-12 ENCOUNTER — APPOINTMENT (OUTPATIENT)
Dept: MRI IMAGING | Age: 35
DRG: 444 | End: 2020-07-12
Attending: INTERNAL MEDICINE
Payer: COMMERCIAL

## 2020-07-12 LAB
ALBUMIN SERPL-MCNC: 3 G/DL (ref 3.5–5)
ALBUMIN/GLOB SERPL: 0.9 {RATIO} (ref 1.2–3.5)
ALP SERPL-CCNC: 177 U/L (ref 50–136)
ALT SERPL-CCNC: 569 U/L (ref 12–65)
ANION GAP SERPL CALC-SCNC: 6 MMOL/L (ref 7–16)
AST SERPL-CCNC: 507 U/L (ref 15–37)
ATRIAL RATE: 105 BPM
BILIRUB SERPL-MCNC: 1.7 MG/DL (ref 0.2–1.1)
BUN SERPL-MCNC: 9 MG/DL (ref 6–23)
CALCIUM SERPL-MCNC: 8.4 MG/DL (ref 8.3–10.4)
CALCULATED P AXIS, ECG09: 69 DEGREES
CALCULATED R AXIS, ECG10: 63 DEGREES
CALCULATED T AXIS, ECG11: 38 DEGREES
CHLORIDE SERPL-SCNC: 109 MMOL/L (ref 98–107)
CO2 SERPL-SCNC: 25 MMOL/L (ref 21–32)
CREAT SERPL-MCNC: 0.62 MG/DL (ref 0.6–1)
DIAGNOSIS, 93000: NORMAL
ERYTHROCYTE [DISTWIDTH] IN BLOOD BY AUTOMATED COUNT: 12.8 % (ref 11.9–14.6)
GLOBULIN SER CALC-MCNC: 3.4 G/DL (ref 2.3–3.5)
GLUCOSE SERPL-MCNC: 85 MG/DL (ref 65–100)
HCT VFR BLD AUTO: 34.3 % (ref 35.8–46.3)
HGB BLD-MCNC: 11.8 G/DL (ref 11.7–15.4)
LIPASE SERPL-CCNC: 265 U/L (ref 73–393)
MCH RBC QN AUTO: 32 PG (ref 26.1–32.9)
MCHC RBC AUTO-ENTMCNC: 34.4 G/DL (ref 31.4–35)
MCV RBC AUTO: 93 FL (ref 79.6–97.8)
NRBC # BLD: 0 K/UL (ref 0–0.2)
P-R INTERVAL, ECG05: 164 MS
PLATELET # BLD AUTO: 189 K/UL (ref 150–450)
PMV BLD AUTO: 10.7 FL (ref 9.4–12.3)
POTASSIUM SERPL-SCNC: 3.6 MMOL/L (ref 3.5–5.1)
PROT SERPL-MCNC: 6.4 G/DL (ref 6.3–8.2)
Q-T INTERVAL, ECG07: 326 MS
QRS DURATION, ECG06: 72 MS
QTC CALCULATION (BEZET), ECG08: 430 MS
RBC # BLD AUTO: 3.69 M/UL (ref 4.05–5.2)
SODIUM SERPL-SCNC: 140 MMOL/L (ref 136–145)
TRIGL SERPL-MCNC: 64 MG/DL (ref 35–150)
VENTRICULAR RATE, ECG03: 105 BPM
WBC # BLD AUTO: 4.9 K/UL (ref 4.3–11.1)

## 2020-07-12 PROCEDURE — 74181 MRI ABDOMEN W/O CONTRAST: CPT

## 2020-07-12 PROCEDURE — 84478 ASSAY OF TRIGLYCERIDES: CPT

## 2020-07-12 PROCEDURE — 74011250636 HC RX REV CODE- 250/636: Performed by: NURSE PRACTITIONER

## 2020-07-12 PROCEDURE — 85027 COMPLETE CBC AUTOMATED: CPT

## 2020-07-12 PROCEDURE — 74011250637 HC RX REV CODE- 250/637: Performed by: INTERNAL MEDICINE

## 2020-07-12 PROCEDURE — 80053 COMPREHEN METABOLIC PANEL: CPT

## 2020-07-12 PROCEDURE — 65270000029 HC RM PRIVATE

## 2020-07-12 PROCEDURE — 36415 COLL VENOUS BLD VENIPUNCTURE: CPT

## 2020-07-12 PROCEDURE — 74011250636 HC RX REV CODE- 250/636: Performed by: INTERNAL MEDICINE

## 2020-07-12 PROCEDURE — 83690 ASSAY OF LIPASE: CPT

## 2020-07-12 PROCEDURE — 74011250636 HC RX REV CODE- 250/636: Performed by: FAMILY MEDICINE

## 2020-07-12 PROCEDURE — 74011000250 HC RX REV CODE- 250: Performed by: INTERNAL MEDICINE

## 2020-07-12 RX ORDER — ONDANSETRON 2 MG/ML
4 INJECTION INTRAMUSCULAR; INTRAVENOUS
Status: DISCONTINUED | OUTPATIENT
Start: 2020-07-12 | End: 2020-07-14 | Stop reason: HOSPADM

## 2020-07-12 RX ORDER — NALOXONE HYDROCHLORIDE 1 MG/ML
1 INJECTION INTRAMUSCULAR; INTRAVENOUS; SUBCUTANEOUS
Status: DISCONTINUED | OUTPATIENT
Start: 2020-07-12 | End: 2020-07-12 | Stop reason: SDUPTHER

## 2020-07-12 RX ORDER — OMEPRAZOLE 40 MG/1
40 CAPSULE, DELAYED RELEASE ORAL DAILY
COMMUNITY
End: 2021-09-23

## 2020-07-12 RX ORDER — AMOXICILLIN 250 MG
1 CAPSULE ORAL DAILY
Status: DISCONTINUED | OUTPATIENT
Start: 2020-07-12 | End: 2020-07-14 | Stop reason: HOSPADM

## 2020-07-12 RX ORDER — SODIUM CHLORIDE 0.9 % (FLUSH) 0.9 %
5-40 SYRINGE (ML) INJECTION EVERY 8 HOURS
Status: DISCONTINUED | OUTPATIENT
Start: 2020-07-12 | End: 2020-07-14 | Stop reason: HOSPADM

## 2020-07-12 RX ORDER — OXYCODONE HYDROCHLORIDE 5 MG/1
5 TABLET ORAL
Status: DISCONTINUED | OUTPATIENT
Start: 2020-07-12 | End: 2020-07-14 | Stop reason: HOSPADM

## 2020-07-12 RX ORDER — HYDROMORPHONE HYDROCHLORIDE 1 MG/ML
0.5 INJECTION, SOLUTION INTRAMUSCULAR; INTRAVENOUS; SUBCUTANEOUS
Status: DISCONTINUED | OUTPATIENT
Start: 2020-07-12 | End: 2020-07-14 | Stop reason: HOSPADM

## 2020-07-12 RX ORDER — SUCRALFATE 1 G/10ML
2 SUSPENSION ORAL 4 TIMES DAILY
COMMUNITY

## 2020-07-12 RX ORDER — SODIUM CHLORIDE 0.9 % (FLUSH) 0.9 %
5-40 SYRINGE (ML) INJECTION AS NEEDED
Status: DISCONTINUED | OUTPATIENT
Start: 2020-07-12 | End: 2020-07-14 | Stop reason: HOSPADM

## 2020-07-12 RX ORDER — DIPHENHYDRAMINE HCL 25 MG
25 CAPSULE ORAL
Status: DISCONTINUED | OUTPATIENT
Start: 2020-07-12 | End: 2020-07-14 | Stop reason: HOSPADM

## 2020-07-12 RX ORDER — NALOXONE HYDROCHLORIDE 0.4 MG/ML
1 INJECTION, SOLUTION INTRAMUSCULAR; INTRAVENOUS; SUBCUTANEOUS DAILY PRN
Status: DISCONTINUED | OUTPATIENT
Start: 2020-07-12 | End: 2020-07-14 | Stop reason: HOSPADM

## 2020-07-12 RX ORDER — HEPARIN SODIUM 5000 [USP'U]/ML
5000 INJECTION, SOLUTION INTRAVENOUS; SUBCUTANEOUS EVERY 8 HOURS
Status: DISCONTINUED | OUTPATIENT
Start: 2020-07-12 | End: 2020-07-14 | Stop reason: HOSPADM

## 2020-07-12 RX ADMIN — SODIUM CHLORIDE, SODIUM LACTATE, POTASSIUM CHLORIDE, AND CALCIUM CHLORIDE 200 ML/HR: 600; 310; 30; 20 INJECTION, SOLUTION INTRAVENOUS at 06:12

## 2020-07-12 RX ADMIN — FAMOTIDINE 20 MG: 10 INJECTION INTRAVENOUS at 22:36

## 2020-07-12 RX ADMIN — ONDANSETRON 4 MG: 2 INJECTION INTRAMUSCULAR; INTRAVENOUS at 22:43

## 2020-07-12 RX ADMIN — HEPARIN SODIUM 5000 UNITS: 5000 INJECTION INTRAVENOUS; SUBCUTANEOUS at 09:07

## 2020-07-12 RX ADMIN — ONDANSETRON 4 MG: 2 INJECTION INTRAMUSCULAR; INTRAVENOUS at 02:05

## 2020-07-12 RX ADMIN — Medication 10 ML: at 14:28

## 2020-07-12 RX ADMIN — SODIUM CHLORIDE, SODIUM LACTATE, POTASSIUM CHLORIDE, AND CALCIUM CHLORIDE 150 ML/HR: 600; 310; 30; 20 INJECTION, SOLUTION INTRAVENOUS at 19:59

## 2020-07-12 RX ADMIN — HYDROMORPHONE HYDROCHLORIDE 0.5 MG: 1 INJECTION, SOLUTION INTRAMUSCULAR; INTRAVENOUS; SUBCUTANEOUS at 02:05

## 2020-07-12 RX ADMIN — FAMOTIDINE 20 MG: 10 INJECTION INTRAVENOUS at 09:07

## 2020-07-12 RX ADMIN — SODIUM CHLORIDE, SODIUM LACTATE, POTASSIUM CHLORIDE, AND CALCIUM CHLORIDE 200 ML/HR: 600; 310; 30; 20 INJECTION, SOLUTION INTRAVENOUS at 14:25

## 2020-07-12 RX ADMIN — HEPARIN SODIUM 5000 UNITS: 5000 INJECTION INTRAVENOUS; SUBCUTANEOUS at 02:08

## 2020-07-12 RX ADMIN — OXYCODONE 5 MG: 5 TABLET ORAL at 10:38

## 2020-07-12 RX ADMIN — SODIUM CHLORIDE, SODIUM LACTATE, POTASSIUM CHLORIDE, AND CALCIUM CHLORIDE 200 ML/HR: 600; 310; 30; 20 INJECTION, SOLUTION INTRAVENOUS at 00:50

## 2020-07-12 RX ADMIN — HEPARIN SODIUM 5000 UNITS: 5000 INJECTION INTRAVENOUS; SUBCUTANEOUS at 17:36

## 2020-07-12 RX ADMIN — ONDANSETRON 4 MG: 2 INJECTION INTRAMUSCULAR; INTRAVENOUS at 10:42

## 2020-07-12 RX ADMIN — SENNOSIDES AND DOCUSATE SODIUM 1 TABLET: 8.6; 5 TABLET ORAL at 10:40

## 2020-07-12 RX ADMIN — HYDROMORPHONE HYDROCHLORIDE 0.5 MG: 1 INJECTION, SOLUTION INTRAMUSCULAR; INTRAVENOUS; SUBCUTANEOUS at 22:45

## 2020-07-12 RX ADMIN — SODIUM CHLORIDE, SODIUM LACTATE, POTASSIUM CHLORIDE, AND CALCIUM CHLORIDE 200 ML/HR: 600; 310; 30; 20 INJECTION, SOLUTION INTRAVENOUS at 09:06
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
## 2020-07-12 NOTE — PROGRESS NOTES
Hospitalist Progress Note     Admit Date:  2020  9:40 PM   Name:  Uriel Lee   Age:  36 y.o.  :  1985   MRN:  [id number]   PCP:  Rose Car MD  Treatment Team: Attending Provider: Akbar Morales MD    Subjective:   HPI and or CC:  36year old AAF PMH recent lap germain on  and  EGD 7/10 normal esophagus-dilated. Stomach and duodenum biopsied at [facility] presented to the ED with complaints of continued abdominal pain with N/V. Pain noted in epigastric region with feelings of bloating. Lipase  noted 1500 with TB 1.7 and Alk phos 192. GI was consulted. :  Patient remains NPO, MRCP planned for today. Lipase now normal range. GI following. Afebrile, no leukocytosis. TB remains 1.7, transaminases remain 500s. Patient states feeling better today. Objective:     Patient Vitals for the past 24 hrs:   Temp Pulse Resp BP SpO2   20 1155 97.5 °F (36.4 °C) 78 17 114/72 96 %   20 0820  67  91/58    20 0806 97.8 °F (36.6 °C) 64 16 (!) 82/48 95 %   20 0500 98 °F (36.7 °C) (!) 58 16 93/52 98 %   20 0037 98.3 °F (36.8 °C) 66 16 114/67 99 %   20 0000  82 18 124/89 100 %   20 2047 97.9 °F (36.6 °C) (!) 107 20 (!) 132/96 100 %     Oxygen Therapy  O2 Sat (%): 96 % (20 1155)  O2 Device: Room air (20 0000)    Intake/Output Summary (Last 24 hours) at 2020 1454  Last data filed at 2020 1155  Gross per 24 hour   Intake 2046 ml   Output 900 ml   Net 1146 ml         REVIEW OF SYSTEMS: Comprehensive ROS performed and negative except as stated in HPI. Physical Examination:  General:          Alert, cooperative, no distress, appears stated age. Head:               Normocephalic, without obvious abnormality, atraumatic. Nose:               Nares normal. No drainage or sinus tenderness. Lungs:             Clear to auscultation bilaterally. No Wheezing or Rhonchi. No rales.   Heart:              Regular rate and rhythm,  no murmur, rub or gallop. Abdomen:        +BS exquisite tender epigastrium, No rebound  Extremities:     No cyanosis. No edema. No clubbing  Skin:                Texture, turgor normal. No rashes or lesions. Not Jaundiced  Neurologic:      Alert and oriented x 3, no focal deficits   Data Review:  I have reviewed all labs, meds, telemetry events, and studies from the last 24 hours. Recent Results (from the past 24 hour(s))   EKG, 12 LEAD, INITIAL    Collection Time: 07/11/20  8:48 PM   Result Value Ref Range    Ventricular Rate 105 BPM    Atrial Rate 105 BPM    P-R Interval 164 ms    QRS Duration 72 ms    Q-T Interval 326 ms    QTC Calculation (Bezet) 430 ms    Calculated P Axis 69 degrees    Calculated R Axis 63 degrees    Calculated T Axis 38 degrees    Diagnosis       Sinus tachycardia  Otherwise normal ECG  When compared with ECG of 14-JUN-2020 23:14,  Criteria for Anterior infarct are no longer Present  Confirmed by Yesika Melvin ([id number]) on 7/12/2020 8:31:01 AM     CBC WITH AUTOMATED DIFF    Collection Time: 07/11/20  8:56 PM   Result Value Ref Range    WBC 8.7 4.3 - 11.1 K/uL    RBC 4.32 4.05 - 5.2 M/uL    HGB 13.1 11.7 - 15.4 g/dL    HCT 40.3 35.8 - 46.3 %    MCV 93.3 79.6 - 97.8 FL    MCH 30.3 26.1 - 32.9 PG    MCHC 32.5 31.4 - 35.0 g/dL    RDW 12.8 11.9 - 14.6 %    PLATELET 974 260 - 726 K/uL    MPV 10.7 9.4 - 12.3 FL    ABSOLUTE NRBC 0.00 0.0 - 0.2 K/uL    DF AUTOMATED      NEUTROPHILS 65 43 - 78 %    LYMPHOCYTES 24 13 - 44 %    MONOCYTES 7 4.0 - 12.0 %    EOSINOPHILS 3 0.5 - 7.8 %    BASOPHILS 1 0.0 - 2.0 %    IMMATURE GRANULOCYTES 0 0.0 - 5.0 %    ABS. NEUTROPHILS 5.7 1.7 - 8.2 K/UL    ABS. LYMPHOCYTES 2.1 0.5 - 4.6 K/UL    ABS. MONOCYTES 0.6 0.1 - 1.3 K/UL    ABS. EOSINOPHILS 0.3 0.0 - 0.8 K/UL    ABS. BASOPHILS 0.0 0.0 - 0.2 K/UL    ABS. IMM.  GRANS. 0.0 0.0 - 0.5 K/UL   METABOLIC PANEL, COMPREHENSIVE    Collection Time: 07/11/20  8:56 PM   Result Value Ref Range    Sodium 141 136 - 145 mmol/L    Potassium 3.6 3.5 - 5.1 mmol/L    Chloride 109 (H) 98 - 107 mmol/L    CO2 24 21 - 32 mmol/L    Anion gap 8 7 - 16 mmol/L    Glucose 99 65 - 100 mg/dL    BUN 13 6 - 23 MG/DL    Creatinine 0.79 0.6 - 1.0 MG/DL    GFR est AA >60 >60 ml/min/1.73m2    GFR est non-AA >60 >60 ml/min/1.73m2    Calcium 9.7 8.3 - 10.4 MG/DL    Bilirubin, total 1.7 (H) 0.2 - 1.1 MG/DL    ALT (SGPT) 521 (H) 12 - 65 U/L    AST (SGOT) 520 (H) 15 - 37 U/L    Alk.  phosphatase 192 (H) 50 - 136 U/L    Protein, total 7.8 6.3 - 8.2 g/dL    Albumin 3.9 3.5 - 5.0 g/dL    Globulin 3.9 (H) 2.3 - 3.5 g/dL    A-G Ratio 1.0 (L) 1.2 - 3.5     LIPASE    Collection Time: 07/11/20  8:56 PM   Result Value Ref Range    Lipase >1,500 (H) 73 - 393 U/L   TROPONIN-HIGH SENSITIVITY    Collection Time: 07/11/20  8:56 PM   Result Value Ref Range    Troponin-High Sensitivity <3.0 0 - 14 pg/mL   HCG URINE, QL. - POC    Collection Time: 07/11/20 10:19 PM   Result Value Ref Range    Pregnancy test,urine (POC) Negative NEG     LIPASE    Collection Time: 07/12/20  5:25 AM   Result Value Ref Range    Lipase 265 73 - 393 U/L   CBC W/O DIFF    Collection Time: 07/12/20  5:25 AM   Result Value Ref Range    WBC 4.9 4.3 - 11.1 K/uL    RBC 3.69 (L) 4.05 - 5.2 M/uL    HGB 11.8 11.7 - 15.4 g/dL    HCT 34.3 (L) 35.8 - 46.3 %    MCV 93.0 79.6 - 97.8 FL    MCH 32.0 26.1 - 32.9 PG    MCHC 34.4 31.4 - 35.0 g/dL    RDW 12.8 11.9 - 14.6 %    PLATELET 240 962 - 532 K/uL    MPV 10.7 9.4 - 12.3 FL    ABSOLUTE NRBC 0.00 0.0 - 0.2 K/uL   METABOLIC PANEL, COMPREHENSIVE    Collection Time: 07/12/20  5:25 AM   Result Value Ref Range    Sodium 140 136 - 145 mmol/L    Potassium 3.6 3.5 - 5.1 mmol/L    Chloride 109 (H) 98 - 107 mmol/L    CO2 25 21 - 32 mmol/L    Anion gap 6 (L) 7 - 16 mmol/L    Glucose 85 65 - 100 mg/dL    BUN 9 6 - 23 MG/DL    Creatinine 0.62 0.6 - 1.0 MG/DL    GFR est AA >60 >60 ml/min/1.73m2    GFR est non-AA >60 >60 ml/min/1.73m2    Calcium 8.4 8.3 - 10.4 MG/DL Bilirubin, total 1.7 (H) 0.2 - 1.1 MG/DL    ALT (SGPT) 569 (H) 12 - 65 U/L    AST (SGOT) 507 (H) 15 - 37 U/L    Alk. phosphatase 177 (H) 50 - 136 U/L    Protein, total 6.4 6.3 - 8.2 g/dL    Albumin 3.0 (L) 3.5 - 5.0 g/dL    Globulin 3.4 2.3 - 3.5 g/dL    A-G Ratio 0.9 (L) 1.2 - 3.5     TRIGLYCERIDE    Collection Time: 07/12/20  5:25 AM   Result Value Ref Range    Triglyceride 64 35 - 150 MG/DL        All Micro Results     None          Current Meds:  Current Facility-Administered Medications   Medication Dose Route Frequency    sodium chloride (NS) flush 5-40 mL  5-40 mL IntraVENous Q8H    sodium chloride (NS) flush 5-40 mL  5-40 mL IntraVENous PRN    HYDROmorphone (PF) (DILAUDID) injection 0.5 mg  0.5 mg IntraVENous Q4H PRN    ondansetron (ZOFRAN) injection 4 mg  4 mg IntraVENous Q4H PRN    heparin (porcine) injection 5,000 Units  5,000 Units SubCUTAneous Q8H    famotidine (PF) (PEPCID) 20 mg in 0.9% sodium chloride 10 mL injection  20 mg IntraVENous Q12H    diphenhydrAMINE (BENADRYL) capsule 25 mg  25 mg Oral Q6H PRN    oxyCODONE IR (ROXICODONE) tablet 5 mg  5 mg Oral Q4H PRN    senna-docusate (PERICOLACE) 8.6-50 mg per tablet 1 Tab  1 Tab Oral DAILY    naloxone (NARCAN) injection 1 mg  1 mg IntraVENous DAILY PRN    sodium chloride 0.9 % bolus infusion 250 mL  250 mL IntraVENous ONCE    lactated Ringers infusion  150 mL/hr IntraVENous CONTINUOUS       Diet:  DIET NPO    Other Studies (last 24 hours):  [address] [place] [address]    Result Date: 7/12/2020  EXAM: Limited abdomen ultrasound. INDICATION: Elevated liver enzymes. COMPARISON: None. TECHNIQUE: Standard protocol limited right upper quadrant abdomen ultrasound. FINDINGS: - Liver: Within normal limits. - Gallbladder: There has been a prior cholecystectomy. No fluid is seen in the gallbladder fossa. - Bile ducts: Within normal limits. The common bile duct measures 6.3 mm. - Pancreas: The pancreas was obscured by overlying bowel gas.  - Right kidney: Within normal limits. - Aorta and IVC: Within normal limits. - Portal vein: Within normal limits. - Other: No ascites. IMPRESSION: Unremarkable study, in this postcholecystectomy patient. Assessment and Plan:     Hospital Problems as of 7/12/2020 Date Reviewed: 7/1/2020          Codes Class Noted - Resolved POA    * (Principal) Pancreatitis ICD-10-CM: K85.90  ICD-9-CM: 734.0  7/11/2020 - Present Yes        Transaminitis ICD-10-CM: R74.0  ICD-9-CM: 790.4  7/11/2020 - Present Yes        S/P cholecystectomy ICD-10-CM: Z90.49  ICD-9-CM: V45.79  7/1/2020 - Present Yes              A/P:    1. Acute pancreatitis; likely choledocolithiasis after recent calculus cholecystitis:  Continue to trend lipase-now normal  NPO except ice chips  GI following. IVF  Pain/nausea control    2.  Tobacco use:  Smoking cessation   Nicotine patch          Signed:  Tatiana Bustillos NP

## 2020-07-12 NOTE — PROGRESS NOTES
END OF SHIFT NOTE:    INTAKE/OUTPUT  07/11 0701 - 07/12 0700  In: 2046 [I.V.:2046]  Out: -   Voiding: YES  Catheter: NO  Drain:              Flatus: Patient does not have flatus present. Stool:  0 occurrences. Characteristics:       Emesis: 0 occurrences. Characteristics:        VITAL SIGNS  Patient Vitals for the past 12 hrs:   Temp Pulse Resp BP SpO2   07/12/20 1600 97.9 °F (36.6 °C) 70 18 98/67 97 %   07/12/20 1155 97.5 °F (36.4 °C) 78 17 114/72 96 %   07/12/20 0820  67  91/58    07/12/20 0806 97.8 °F (36.6 °C) 64 16 (!) 82/48 95 %       Pain Assessment  Pain Intensity 1: 0 (07/12/20 0300)  Pain Location 1: Abdomen  Pain Intervention(s) 1: Medication (see MAR)  Patient Stated Pain Goal: 0    Ambulating  Yes    Shift report given to oncoming nurse at the bedside.     Jose Feliciano RN

## 2020-07-12 NOTE — PROGRESS NOTES
TRANSFER - IN REPORT:    Verbal report received from Mine(name) on Jose Chelsy  being received from er(unit) for routine progression of care      Report consisted of patients Situation, Background, Assessment and   Recommendations(SBAR). Information from the following report(s) ED Summary was reviewed with the receiving nurse. Opportunity for questions and clarification was provided. Assessment completed upon patients arrival to unit and care assumed. Arrived awake, alert, ambulatory. Oriented to room/call light. No pain presently.
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
## 2020-07-12 NOTE — PROGRESS NOTES
GASTROENTEROLOGY DAILY PROGRESS NOTE    Patient: Loree Pedraza MRN: [id number]  SSN: [id number]    YOB: 1985  Age: 36 y.o. Sex: female       Admit Date:  7/11/2020    Today's Date:  7/12/2020    Subjective:     Feels better with less epigastric pain. Still with some back pain. Lipase is down to 265. Hgb down to 11.8. Tbili still 1.7 and transaminases still in the 500 range. Objective:   Vitals:  Visit Vitals  BP 91/58   Pulse 67   Temp 97.8 °F (36.6 °C)   Resp 16   Ht 5' 3.25\" (1.607 m)   Wt 89.8 kg (198 lb)   SpO2 95%   BMI 34.80 kg/m²     Intake/Output:  Current Shift:  No intake/output data recorded. Previous three completed shifts:  07/10 1901 - 07/12 0700  In: 2046 [I.V.:2046]  Out: -     Exam:  General appearance: alert, cooperative, no distress  Lungs: clear to auscultation bilaterally anteriorly  Heart: regular rate and rhythm  Abdomen: soft, tender mildly in the epigastrium. Bowel sounds normal. No masses,  no organomegaly  Extremities: extremities normal, atraumatic, no cyanosis or edema  Neuro:  alert and oriented    Data Review (Labs):    Recent Labs     07/12/20  0525 07/11/20 2056   WBC 4.9 8.7   HGB 11.8 13.1   HCT 34.3* 40.3    215   MCV 93.0 93.3    141   K 3.6 3.6   * 109*   CO2 25 24   BUN 9 13   CREA 0.62 0.79   CA 8.4 9.7   GLU 85 99   * 192*   * 521*   TBILI 1.7* 1.7*   LPSE 265 >1,500*     US 7-12-20    FINDINGS:     - Liver: Within normal limits. - Gallbladder: There has been a prior cholecystectomy. No fluid is seen in the  gallbladder fossa. - Bile ducts: Within normal limits. The common bile duct measures 6.3 mm.  - Pancreas: The pancreas was obscured by overlying bowel gas. - Right kidney: Within normal limits. - Aorta and IVC: Within normal limits. - Portal vein: Within normal limits.  - Other: No ascites.     IMPRESSION: Unremarkable study, in this postcholecystectomy patient.       Patient is a 36 y.o. female who underwent a laparoscopic cholecystectomy on 6-17-20 by Dr Saul Taylor. She was having about 2 weeks then of RUQ pain, N/V, back pain. On US then she had multiple gallstones and a thick GB wall. Her CBD was 4.1 mm and there was no intrahepatic dilation. She also had a fatty liver. The GB path was chronic cholecystitis and cholelithiasis. Her pain post surgery continued and she continue to have sx as above. On 7-10-20 she underwent an EGD for sx and some dysphagia. Dr Michael Cervantes at [facility] found a normal upper gi tract, did biopsies for JOHN which was neg and gastric random which are pending. He empirically dilated her with a 51 Fr Savary without complication. She denies ETOH use      Assessment:     Principal Problem:    Pancreatitis (7/11/2020)    Active Problems:    S/P cholecystectomy (7/1/2020)      Transaminitis (7/11/2020)        Plan:     US negative for choledocholithiasis. . I will order an MRCP. May need an ERCP Monday etc.  Continue Pepcid IV BID and NPO except meds and ice for now.     Signed By: Raquel Mo MD     July 12, 2020

## 2020-07-12 NOTE — ED TRIAGE NOTES
Arrives with face mask in place. Reports epigastric/substernal chest pain radiating through to back. Onset 6/15, seen here at that time with resulting cholecystectomy. States intermittent since then, describes as \"stabbing\" and reports same pain as prior to cholecystectomy. Reports n/v. Denies diarrhea, cough, shortness of breath, fever/chills. Surgical site appears to be healing well. Reports EGD done Friday with esophageal dilation and biopsy done.

## 2020-07-12 NOTE — ED NOTES
TRANSFER - OUT REPORT:    Verbal report given to Snow Byers RN(name) on Mancil Lacks  being transferred to [facility](unit) for routine progression of care       Report consisted of patients Situation, Background, Assessment and   Recommendations(SBAR). Information from the following report(s) SBAR, ED Summary and MAR was reviewed with the receiving nurse. Lines:   Peripheral IV 07/11/20 Left Antecubital (Active)   Site Assessment Clean, dry, & intact 07/11/20 2054   Phlebitis Assessment 0 07/11/20 2054   Infiltration Assessment 0 07/11/20 2054   Dressing Status Clean, dry, & intact 07/11/20 2054   Dressing Type Transparent 07/11/20 2054   Hub Color/Line Status Red 07/11/20 2054        Opportunity for questions and clarification was provided.       Patient transported with:   [employer]

## 2020-07-13 ENCOUNTER — ANESTHESIA (OUTPATIENT)
Dept: ENDOSCOPY | Age: 35
DRG: 444 | End: 2020-07-13
Payer: COMMERCIAL

## 2020-07-13 ENCOUNTER — ANESTHESIA EVENT (OUTPATIENT)
Dept: ENDOSCOPY | Age: 35
DRG: 444 | End: 2020-07-13
Payer: COMMERCIAL

## 2020-07-13 ENCOUNTER — APPOINTMENT (OUTPATIENT)
Dept: GENERAL RADIOLOGY | Age: 35
DRG: 444 | End: 2020-07-13
Attending: INTERNAL MEDICINE
Payer: COMMERCIAL

## 2020-07-13 PROBLEM — R74.8 ELEVATED LIVER ENZYMES: Status: ACTIVE | Noted: 2020-07-13

## 2020-07-13 PROBLEM — K80.50 CHOLEDOCHOLITHIASIS: Status: ACTIVE | Noted: 2020-07-13

## 2020-07-13 PROBLEM — E80.6 HYPERBILIRUBINEMIA: Status: ACTIVE | Noted: 2020-07-13

## 2020-07-13 LAB
ALBUMIN SERPL-MCNC: 2.8 G/DL (ref 3.5–5)
ALBUMIN/GLOB SERPL: 0.8 {RATIO} (ref 1.2–3.5)
ALP SERPL-CCNC: 151 U/L (ref 50–136)
ALT SERPL-CCNC: 377 U/L (ref 12–65)
ANION GAP SERPL CALC-SCNC: 7 MMOL/L (ref 7–16)
AST SERPL-CCNC: 171 U/L (ref 15–37)
BASOPHILS # BLD: 0 K/UL (ref 0–0.2)
BASOPHILS NFR BLD: 0 % (ref 0–2)
BILIRUB DIRECT SERPL-MCNC: 0.2 MG/DL
BILIRUB SERPL-MCNC: 0.9 MG/DL (ref 0.2–1.1)
BUN SERPL-MCNC: 7 MG/DL (ref 6–23)
CALCIUM SERPL-MCNC: 8.1 MG/DL (ref 8.3–10.4)
CHLORIDE SERPL-SCNC: 109 MMOL/L (ref 98–107)
CO2 SERPL-SCNC: 23 MMOL/L (ref 21–32)
CREAT SERPL-MCNC: 0.53 MG/DL (ref 0.6–1)
DIFFERENTIAL METHOD BLD: ABNORMAL
EOSINOPHIL # BLD: 0.6 K/UL (ref 0–0.8)
EOSINOPHIL NFR BLD: 13 % (ref 0.5–7.8)
ERYTHROCYTE [DISTWIDTH] IN BLOOD BY AUTOMATED COUNT: 12.4 % (ref 11.9–14.6)
GLOBULIN SER CALC-MCNC: 3.3 G/DL (ref 2.3–3.5)
GLUCOSE SERPL-MCNC: 58 MG/DL (ref 65–100)
HCT VFR BLD AUTO: 35.3 % (ref 35.8–46.3)
HGB BLD-MCNC: 11.2 G/DL (ref 11.7–15.4)
IMM GRANULOCYTES # BLD AUTO: 0 K/UL (ref 0–0.5)
IMM GRANULOCYTES NFR BLD AUTO: 1 % (ref 0–5)
LYMPHOCYTES # BLD: 1.8 K/UL (ref 0.5–4.6)
LYMPHOCYTES NFR BLD: 35 % (ref 13–44)
MCH RBC QN AUTO: 30.4 PG (ref 26.1–32.9)
MCHC RBC AUTO-ENTMCNC: 31.7 G/DL (ref 31.4–35)
MCV RBC AUTO: 95.9 FL (ref 79.6–97.8)
MONOCYTES # BLD: 0.3 K/UL (ref 0.1–1.3)
MONOCYTES NFR BLD: 6 % (ref 4–12)
NEUTS SEG # BLD: 2.3 K/UL (ref 1.7–8.2)
NEUTS SEG NFR BLD: 46 % (ref 43–78)
NRBC # BLD: 0 K/UL (ref 0–0.2)
PLATELET # BLD AUTO: 158 K/UL (ref 150–450)
PMV BLD AUTO: 10.6 FL (ref 9.4–12.3)
POTASSIUM SERPL-SCNC: 3.5 MMOL/L (ref 3.5–5.1)
PROT SERPL-MCNC: 6.1 G/DL (ref 6.3–8.2)
RBC # BLD AUTO: 3.68 M/UL (ref 4.05–5.2)
SODIUM SERPL-SCNC: 139 MMOL/L (ref 136–145)
WBC # BLD AUTO: 5.1 K/UL (ref 4.3–11.1)

## 2020-07-13 PROCEDURE — 77030039425 HC BLD LARYNG TRULITE DISP TELE -A: Performed by: NURSE ANESTHETIST, CERTIFIED REGISTERED

## 2020-07-13 PROCEDURE — 36415 COLL VENOUS BLD VENIPUNCTURE: CPT

## 2020-07-13 PROCEDURE — 77030040922 HC BLNKT HYPOTHRM STRY -A: Performed by: NURSE ANESTHETIST, CERTIFIED REGISTERED

## 2020-07-13 PROCEDURE — 77030007288 HC DEV LOK BILI BSC -A: Performed by: INTERNAL MEDICINE

## 2020-07-13 PROCEDURE — 77030037088 HC TUBE ENDOTRACH ORAL NSL COVD-A: Performed by: NURSE ANESTHETIST, CERTIFIED REGISTERED

## 2020-07-13 PROCEDURE — 76040000025: Performed by: INTERNAL MEDICINE

## 2020-07-13 PROCEDURE — 85025 COMPLETE CBC W/AUTO DIFF WBC: CPT

## 2020-07-13 PROCEDURE — 74011250636 HC RX REV CODE- 250/636: Performed by: NURSE ANESTHETIST, CERTIFIED REGISTERED

## 2020-07-13 PROCEDURE — 74011250636 HC RX REV CODE- 250/636: Performed by: ANESTHESIOLOGY

## 2020-07-13 PROCEDURE — 77030032490 HC SLV COMPR SCD KNE COVD -B: Performed by: INTERNAL MEDICINE

## 2020-07-13 PROCEDURE — 77030019908 HC STETH ESOPH SIMS -A: Performed by: NURSE ANESTHETIST, CERTIFIED REGISTERED

## 2020-07-13 PROCEDURE — 80048 BASIC METABOLIC PNL TOTAL CA: CPT

## 2020-07-13 PROCEDURE — 80076 HEPATIC FUNCTION PANEL: CPT

## 2020-07-13 PROCEDURE — 74011636320 HC RX REV CODE- 636/320: Performed by: INTERNAL MEDICINE

## 2020-07-13 PROCEDURE — 74011000250 HC RX REV CODE- 250: Performed by: INTERNAL MEDICINE

## 2020-07-13 PROCEDURE — 74011000250 HC RX REV CODE- 250: Performed by: ANESTHESIOLOGY

## 2020-07-13 PROCEDURE — 74011250636 HC RX REV CODE- 250/636: Performed by: FAMILY MEDICINE

## 2020-07-13 PROCEDURE — 74011250636 HC RX REV CODE- 250/636: Performed by: INTERNAL MEDICINE

## 2020-07-13 PROCEDURE — 77030009038 HC CATH BILI STN RTVR BSC -C: Performed by: INTERNAL MEDICINE

## 2020-07-13 PROCEDURE — 77030012595 HC SPHNTOM BILI BSC -D: Performed by: INTERNAL MEDICINE

## 2020-07-13 PROCEDURE — 65270000029 HC RM PRIVATE

## 2020-07-13 PROCEDURE — 74011250637 HC RX REV CODE- 250/637: Performed by: ANESTHESIOLOGY

## 2020-07-13 PROCEDURE — 74330 X-RAY BILE/PANC ENDOSCOPY: CPT

## 2020-07-13 PROCEDURE — 74011000250 HC RX REV CODE- 250: Performed by: NURSE ANESTHETIST, CERTIFIED REGISTERED

## 2020-07-13 PROCEDURE — 76060000032 HC ANESTHESIA 0.5 TO 1 HR: Performed by: INTERNAL MEDICINE

## 2020-07-13 PROCEDURE — 74011250637 HC RX REV CODE- 250/637: Performed by: INTERNAL MEDICINE

## 2020-07-13 PROCEDURE — 0FC98ZZ EXTIRPATION OF MATTER FROM COMMON BILE DUCT, VIA NATURAL OR ARTIFICIAL OPENING ENDOSCOPIC: ICD-10-PCS | Performed by: INTERNAL MEDICINE

## 2020-07-13 RX ORDER — MIDAZOLAM HYDROCHLORIDE 1 MG/ML
2 INJECTION, SOLUTION INTRAMUSCULAR; INTRAVENOUS
Status: ACTIVE | OUTPATIENT
Start: 2020-07-13 | End: 2020-07-14

## 2020-07-13 RX ORDER — FENTANYL CITRATE 50 UG/ML
100 INJECTION, SOLUTION INTRAMUSCULAR; INTRAVENOUS ONCE
Status: ACTIVE | OUTPATIENT
Start: 2020-07-13 | End: 2020-07-13

## 2020-07-13 RX ORDER — MIDAZOLAM HYDROCHLORIDE 1 MG/ML
2 INJECTION, SOLUTION INTRAMUSCULAR; INTRAVENOUS ONCE
Status: ACTIVE | OUTPATIENT
Start: 2020-07-13 | End: 2020-07-13

## 2020-07-13 RX ORDER — SODIUM CHLORIDE, SODIUM LACTATE, POTASSIUM CHLORIDE, CALCIUM CHLORIDE 600; 310; 30; 20 MG/100ML; MG/100ML; MG/100ML; MG/100ML
INJECTION, SOLUTION INTRAVENOUS
Status: DISCONTINUED | OUTPATIENT
Start: 2020-07-13 | End: 2020-07-13 | Stop reason: HOSPADM

## 2020-07-13 RX ORDER — LIDOCAINE HYDROCHLORIDE 10 MG/ML
0.1 INJECTION INFILTRATION; PERINEURAL AS NEEDED
Status: DISCONTINUED | OUTPATIENT
Start: 2020-07-13 | End: 2020-07-14 | Stop reason: HOSPADM

## 2020-07-13 RX ORDER — SUCCINYLCHOLINE CHLORIDE 20 MG/ML
INJECTION INTRAMUSCULAR; INTRAVENOUS AS NEEDED
Status: DISCONTINUED | OUTPATIENT
Start: 2020-07-13 | End: 2020-07-13 | Stop reason: HOSPADM

## 2020-07-13 RX ORDER — ROCURONIUM BROMIDE 10 MG/ML
INJECTION, SOLUTION INTRAVENOUS AS NEEDED
Status: DISCONTINUED | OUTPATIENT
Start: 2020-07-13 | End: 2020-07-13 | Stop reason: HOSPADM

## 2020-07-13 RX ORDER — SODIUM CHLORIDE, SODIUM LACTATE, POTASSIUM CHLORIDE, CALCIUM CHLORIDE 600; 310; 30; 20 MG/100ML; MG/100ML; MG/100ML; MG/100ML
100 INJECTION, SOLUTION INTRAVENOUS CONTINUOUS
Status: DISPENSED | OUTPATIENT
Start: 2020-07-13 | End: 2020-07-14

## 2020-07-13 RX ORDER — LIDOCAINE HYDROCHLORIDE 20 MG/ML
INJECTION, SOLUTION EPIDURAL; INFILTRATION; INTRACAUDAL; PERINEURAL AS NEEDED
Status: DISCONTINUED | OUTPATIENT
Start: 2020-07-13 | End: 2020-07-13 | Stop reason: HOSPADM

## 2020-07-13 RX ORDER — PROPOFOL 10 MG/ML
INJECTION, EMULSION INTRAVENOUS AS NEEDED
Status: DISCONTINUED | OUTPATIENT
Start: 2020-07-13 | End: 2020-07-13 | Stop reason: HOSPADM

## 2020-07-13 RX ORDER — SODIUM CHLORIDE, SODIUM LACTATE, POTASSIUM CHLORIDE, CALCIUM CHLORIDE 600; 310; 30; 20 MG/100ML; MG/100ML; MG/100ML; MG/100ML
100 INJECTION, SOLUTION INTRAVENOUS CONTINUOUS
Status: DISCONTINUED | OUTPATIENT
Start: 2020-07-13 | End: 2020-07-14 | Stop reason: HOSPADM

## 2020-07-13 RX ORDER — DEXAMETHASONE SODIUM PHOSPHATE 4 MG/ML
INJECTION, SOLUTION INTRA-ARTICULAR; INTRALESIONAL; INTRAMUSCULAR; INTRAVENOUS; SOFT TISSUE AS NEEDED
Status: DISCONTINUED | OUTPATIENT
Start: 2020-07-13 | End: 2020-07-13 | Stop reason: HOSPADM

## 2020-07-13 RX ORDER — ONDANSETRON 2 MG/ML
INJECTION INTRAMUSCULAR; INTRAVENOUS AS NEEDED
Status: DISCONTINUED | OUTPATIENT
Start: 2020-07-13 | End: 2020-07-13 | Stop reason: HOSPADM

## 2020-07-13 RX ORDER — NALOXONE HYDROCHLORIDE 0.4 MG/ML
0.04 INJECTION, SOLUTION INTRAMUSCULAR; INTRAVENOUS; SUBCUTANEOUS
Status: DISCONTINUED | OUTPATIENT
Start: 2020-07-13 | End: 2020-07-14 | Stop reason: HOSPADM

## 2020-07-13 RX ORDER — OXYCODONE HYDROCHLORIDE 5 MG/1
5 TABLET ORAL
Status: COMPLETED | OUTPATIENT
Start: 2020-07-13 | End: 2020-07-13

## 2020-07-13 RX ORDER — HYDROMORPHONE HYDROCHLORIDE 1 MG/ML
0.5 INJECTION, SOLUTION INTRAMUSCULAR; INTRAVENOUS; SUBCUTANEOUS
Status: DISCONTINUED | OUTPATIENT
Start: 2020-07-13 | End: 2020-07-14 | Stop reason: HOSPADM

## 2020-07-13 RX ADMIN — FAMOTIDINE 20 MG: 10 INJECTION INTRAVENOUS at 21:07

## 2020-07-13 RX ADMIN — SUCCINYLCHOLINE CHLORIDE 180 MG: 20 INJECTION, SOLUTION INTRAMUSCULAR; INTRAVENOUS at 12:42

## 2020-07-13 RX ADMIN — PROMETHAZINE HYDROCHLORIDE 6.25 MG: 25 INJECTION INTRAMUSCULAR; INTRAVENOUS at 13:44

## 2020-07-13 RX ADMIN — PROPOFOL 100 MG: 10 INJECTION, EMULSION INTRAVENOUS at 12:51

## 2020-07-13 RX ADMIN — FAMOTIDINE 20 MG: 10 INJECTION INTRAVENOUS at 08:43

## 2020-07-13 RX ADMIN — ROCURONIUM BROMIDE 10 MG: 10 INJECTION, SOLUTION INTRAVENOUS at 12:42

## 2020-07-13 RX ADMIN — PROPOFOL 30 MG: 10 INJECTION, EMULSION INTRAVENOUS at 13:01

## 2020-07-13 RX ADMIN — PHENYLEPHRINE HYDROCHLORIDE 60 MCG: 10 INJECTION INTRAVENOUS at 12:57

## 2020-07-13 RX ADMIN — ONDANSETRON 4 MG: 2 INJECTION INTRAMUSCULAR; INTRAVENOUS at 12:47

## 2020-07-13 RX ADMIN — OXYCODONE 5 MG: 5 TABLET ORAL at 21:13

## 2020-07-13 RX ADMIN — Medication 10 ML: at 15:15

## 2020-07-13 RX ADMIN — IOPAMIDOL 4 ML: 755 INJECTION, SOLUTION INTRAVENOUS at 13:08

## 2020-07-13 RX ADMIN — Medication 10 ML: at 21:13

## 2020-07-13 RX ADMIN — HEPARIN SODIUM 5000 UNITS: 5000 INJECTION INTRAVENOUS; SUBCUTANEOUS at 17:25

## 2020-07-13 RX ADMIN — LIDOCAINE HYDROCHLORIDE 100 MG: 20 INJECTION, SOLUTION EPIDURAL; INFILTRATION; INTRACAUDAL; PERINEURAL at 12:42

## 2020-07-13 RX ADMIN — SODIUM CHLORIDE, SODIUM LACTATE, POTASSIUM CHLORIDE, AND CALCIUM CHLORIDE: 600; 310; 30; 20 INJECTION, SOLUTION INTRAVENOUS at 11:55

## 2020-07-13 RX ADMIN — SENNOSIDES AND DOCUSATE SODIUM 1 TABLET: 8.6; 5 TABLET ORAL at 08:43

## 2020-07-13 RX ADMIN — PROPOFOL 200 MG: 10 INJECTION, EMULSION INTRAVENOUS at 12:42

## 2020-07-13 RX ADMIN — OXYCODONE 5 MG: 5 TABLET ORAL at 15:15

## 2020-07-13 RX ADMIN — SODIUM CHLORIDE, SODIUM LACTATE, POTASSIUM CHLORIDE, AND CALCIUM CHLORIDE 100 ML/HR: 600; 310; 30; 20 INJECTION, SOLUTION INTRAVENOUS at 17:26

## 2020-07-13 RX ADMIN — SODIUM CHLORIDE, SODIUM LACTATE, POTASSIUM CHLORIDE, AND CALCIUM CHLORIDE 100 ML/HR: 600; 310; 30; 20 INJECTION, SOLUTION INTRAVENOUS at 11:55

## 2020-07-13 RX ADMIN — DEXAMETHASONE SODIUM PHOSPHATE 4 MG: 4 INJECTION, SOLUTION INTRAMUSCULAR; INTRAVENOUS at 12:47

## 2020-07-13 RX ADMIN — PHENYLEPHRINE HYDROCHLORIDE 120 MCG: 10 INJECTION INTRAVENOUS at 12:56

## 2020-07-13 RX ADMIN — HEPARIN SODIUM 5000 UNITS: 5000 INJECTION INTRAVENOUS; SUBCUTANEOUS at 02:05

## 2020-07-13 RX ADMIN — OXYCODONE 5 MG: 5 TABLET ORAL at 13:37

## 2020-07-13 NOTE — PROGRESS NOTES
TRANSFER - OUT REPORT:    Verbal report given to LISETTE Pulliam(name) on Celio Figueroa  being transferred to Gi lab(unit) for ordered procedure       Report consisted of patients Situation, Background, Assessment and   Recommendations(SBAR). Information from the following report(s) SBAR, Kardex, Intake/Output, MAR, Recent Results and Med Rec Status was reviewed with the receiving nurse. Lines:   Peripheral IV 07/12/20 Anterior;Distal;Left Hand (Active)   Site Assessment Clean, dry, & intact 07/12/20 1430   Phlebitis Assessment 0 07/12/20 1430   Infiltration Assessment 0 07/12/20 1430   Dressing Status Clean, dry, & intact 07/12/20 1430   Dressing Type Tape;Transparent 07/12/20 1430   Hub Color/Line Status Infusing 07/12/20 1430       Peripheral IV 07/13/20 (Active)   Site Assessment Clean, dry, & intact 07/13/20 1146   Phlebitis Assessment 0 07/13/20 1146   Infiltration Assessment 0 07/13/20 1146   Dressing Status Clean, dry, & intact 07/13/20 1146   Dressing Type Transparent;Tape 07/13/20 1146        Opportunity for questions and clarification was provided.       Patient transported with:   [employer]

## 2020-07-13 NOTE — PROGRESS NOTES
MRCP yesterday revealing multiple stones in the common bile duct. Patient will be posted for ERCP today with Dr. Sonia Ram.

## 2020-07-13 NOTE — PROGRESS NOTES
TRANSFER - IN REPORT:    Verbal report received from Desean German RN (name) on Dot Friend  being received from room 236 (unit) for ordered procedure. Report consisted of patients Situation, Background, Assessment and   Recommendations(SBAR). Information from the following report(s) SBAR was reviewed with the receiving nurse. Opportunity for questions and clarification was provided. Awaiting transport to bring pt to the GI Lab at this time.

## 2020-07-13 NOTE — PROGRESS NOTES
Pt (and mother via phone call) both politely but adamantly begging for ice/clear liquids. Messaged hospitalists and ice chips only okay'd. Explained this to pt .

## 2020-07-13 NOTE — PROGRESS NOTES
END OF SHIFT NOTE:    INTAKE/OUTPUT  07/12 0701 - 07/13 0700  In: 7056 [I.V.:3384]  Out: 3800 [Urine:3800]  Voiding: YES  Catheter: NO  Drain:              Flatus: Patient does have flatus present. Stool:  0 occurrences. Characteristics:       Emesis: 0 occurrences. Characteristics:        VITAL SIGNS  Patient Vitals for the past 12 hrs:   Temp Pulse Resp BP SpO2   07/13/20 0401 98.4 °F (36.9 °C) 75 16 95/62 95 %   07/12/20 2248 98.4 °F (36.9 °C) 72 16 110/51 96 %   07/12/20 1907 97.8 °F (36.6 °C) 73 18 137/47 97 %       Pain Assessment  Pain Intensity 1: 8 (07/12/20 2248)  Pain Location 1: Abdomen  Pain Intervention(s) 1: Medication (see MAR)  Patient Stated Pain Goal: 0    Ambulating  Yes    Shift report given to oncoming nurse at the bedside.     Claudette Notch, RN

## 2020-07-13 NOTE — INTERVAL H&P NOTE
Update History & Physical 
 
The Patient's History and Physical of July ,  
Plan was reviewed with the patient and I examined the patient. There was no change. The surgical site was confirmed by the patient and me. Plan:  The risk, benefits, expected outcome, and alternative to the recommended procedure have been discussed with the patient. Patient understands and wants to proceed with the procedure.  
 
Electronically signed by Satish Colin MD on 7/13/2020 at 12:22 PM

## 2020-07-13 NOTE — PERIOP NOTES
TRANSFER - OUT REPORT:    Verbal report given to Fidelina Bell RN on Nolan Jaramillo  being transferred to  for routine post - op       Report consisted of patients Situation, Background, Assessment and   Recommendations(SBAR). Information from the following report(s) OR Summary, Procedure Summary, Intake/Output and MAR was reviewed with the receiving nurse. Lines:   Peripheral IV 07/13/20 Right Forearm (Active)   Site Assessment Clean, dry, & intact 07/13/20 1327   Phlebitis Assessment 0 07/13/20 1327   Infiltration Assessment 0 07/13/20 1327   Dressing Status Clean, dry, & intact 07/13/20 1327   Dressing Type Tape;Transparent 07/13/20 1327   Hub Color/Line Status Blue;Patent 07/13/20 1327   Alcohol Cap Used No 07/13/20 1327        Opportunity for questions and clarification was provided. Patient transported with:   O2 @ 0 liters    VTE prophylaxis orders have been written for Nolan Jaramillo.

## 2020-07-13 NOTE — PROCEDURES
ERCP: Endoscopic Retrograde Cholangiopancreatogram    DATE of PROCEDURE: 7/13/2020    INDICATION: Choledocholithiasis     POSTPROCEDURE DIAGNOSIS: Choledocholithiasis     MEDICATIONS ADMINISTERED:  GETA    INSTRUMENT:     PROCEDURE:  After obtaining informed consent, the patient was placed in prone position on the fluoroscopy table. The patient was then sedated. The endoscope was passed under indirect technique to the oropharynx and gently passed into the esophagus. The endoscope was passed to the ampulla. Only partial views of the stomach and duodenum were obtained. After the procedure, the patient was taken to the recovery area in stable condition. FINDINGS:  Limited views of the esophagus and stomach appeared normal. The ampulla was normal in appearance but located in an odd position. Using a 39 sphincterotome preloaded with a 0.025\" wire, the CBD was selectively cannulated. Cholangiogram showed normal appearing ducts with a small distal filling defect. A moderate sphincterotomy was made using ERBE blended current. Next, multiple balloon sweeps were made with a 9-12mm balloon producing 2 moderate sized yellow stones. Occlusion cholangiogram showed no further filling defects. The pancreatic duct was not accessed intentionally.      Estimated Blood Loss: 0 cc-min    ASSESSMENT/PLAN:  Choledocholithiasis    - Trend LFTs  - Start clears  - F/u with inpt rounding team    P Deepa Fraire MD

## 2020-07-13 NOTE — PROGRESS NOTES
Patient c/o of pain in her back. Pillow was provided to patient and patient changed positions. Dr. Box Notice aware. No new orders at this time. Patient resting in bed with eyes closed.

## 2020-07-13 NOTE — PROGRESS NOTES
Name: Uriel Lee MRN: [id number]  : 1985  Age:34 y.o.  female  Admit Date:  2020 LOS: 2      Hospitalist Progress Note    Uriel Lee is a 36 y.o. female with medical history only significant for recent lap germain on  who presented to ED with continued epigastric abdominal pain and nausea vomiting. Lipase was noted to be 1500 with T bili of 1.7 and ALP of 192. MRCP showed multiple common duct stones with a significant bile duct dilatation. Plan for ERCP on . Subjective (20) : Patient is seen and examined at bedside. No acute events reported overnight by nursing staff. Patient reports abdominal pain has resolved. She is hungry and would like to eat. Patient denies fever, chills, chest pains, shortness of breath, n/v, abdominal pain. ROS: 10 point review of systems is otherwise negative with the exception of the elements mentioned above. Objective:    Patient Vitals for the past 24 hrs:   Temp Pulse Resp BP SpO2   20 1240  84 18 120/70 100 %   20 1145 98.3 °F (36.8 °C) 78 18 111/59 100 %   20 0705 98.3 °F (36.8 °C) 94 20 104/65 98 %   20 0401 98.4 °F (36.9 °C) 75 16 95/62 95 %   20 2248 98.4 °F (36.9 °C) 72 16 110/51 96 %   20 1907 97.8 °F (36.6 °C) 73 18 137/47 97 %   20 1600 97.9 °F (36.6 °C) 70 18 98/67 97 %     Oxygen Therapy  O2 Sat (%): 100 % (20 1240)  O2 Device: Room air (20 1240)    Estimated body mass index is 34.8 kg/m² as calculated from the following:    Height as of this encounter: 5' 3.25\" (1.607 m). Weight as of this encounter: 89.8 kg (198 lb). Intake/Output Summary (Last 24 hours) at 2020 1309  Last data filed at 2020 1253  Gross per 24 hour   Intake 3784 ml   Output 2900 ml   Net 884 ml       *Note that automatically entered I/Os may not be accurate; dependent on patient compliance with collection and accurate  by techs.     Physical Exam:   General: alert, awake, no acute distress. Well nourished. Obese  Head:   normocephalic, atraumatic  Eyes, Ears, nose: PERRL, EOMI. Normal conjunctiva  Neck:    supple, non-tender. Trachea midline. Lungs:   CTAB, no wheezing, rhonchi, rales  Cardiac:   RRR, Normal S1 and S2. Abdomen:   Soft, non distended, nontender, +BS  Extremities:   Warm, dry. No edema   Skin:   No rashes, no jaundice  Neuro:  AAOx3. No gross focal neurological deficit  Psychiatric:  No anxiety, calm, cooperative    Data Review:  I have reviewed all labs, meds, and studies from the last 24 hours:    Recent Results (from the past 24 hour(s))   CBC WITH AUTOMATED DIFF    Collection Time: 07/13/20  4:37 AM   Result Value Ref Range    WBC 5.1 4.3 - 11.1 K/uL    RBC 3.68 (L) 4.05 - 5.2 M/uL    HGB 11.2 (L) 11.7 - 15.4 g/dL    HCT 35.3 (L) 35.8 - 46.3 %    MCV 95.9 79.6 - 97.8 FL    MCH 30.4 26.1 - 32.9 PG    MCHC 31.7 31.4 - 35.0 g/dL    RDW 12.4 11.9 - 14.6 %    PLATELET 288 916 - 949 K/uL    MPV 10.6 9.4 - 12.3 FL    ABSOLUTE NRBC 0.00 0.0 - 0.2 K/uL    DF AUTOMATED      NEUTROPHILS 46 43 - 78 %    LYMPHOCYTES 35 13 - 44 %    MONOCYTES 6 4.0 - 12.0 %    EOSINOPHILS 13 (H) 0.5 - 7.8 %    BASOPHILS 0 0.0 - 2.0 %    IMMATURE GRANULOCYTES 1 0.0 - 5.0 %    ABS. NEUTROPHILS 2.3 1.7 - 8.2 K/UL    ABS. LYMPHOCYTES 1.8 0.5 - 4.6 K/UL    ABS. MONOCYTES 0.3 0.1 - 1.3 K/UL    ABS. EOSINOPHILS 0.6 0.0 - 0.8 K/UL    ABS. BASOPHILS 0.0 0.0 - 0.2 K/UL    ABS. IMM.  GRANS. 0.0 0.0 - 0.5 K/UL   METABOLIC PANEL, BASIC    Collection Time: 07/13/20  4:37 AM   Result Value Ref Range    Sodium 139 136 - 145 mmol/L    Potassium 3.5 3.5 - 5.1 mmol/L    Chloride 109 (H) 98 - 107 mmol/L    CO2 23 21 - 32 mmol/L    Anion gap 7 7 - 16 mmol/L    Glucose 58 (L) 65 - 100 mg/dL    BUN 7 6 - 23 MG/DL    Creatinine 0.53 (L) 0.6 - 1.0 MG/DL    GFR est AA >60 >60 ml/min/1.73m2    GFR est non-AA >60 >60 ml/min/1.73m2    Calcium 8.1 (L) 8.3 - 10.4 MG/DL   HEPATIC FUNCTION PANEL    Collection Time: 07/13/20  4:37 AM   Result Value Ref Range    Protein, total 6.1 (L) 6.3 - 8.2 g/dL    Albumin 2.8 (L) 3.5 - 5.0 g/dL    Globulin 3.3 2.3 - 3.5 g/dL    A-G Ratio 0.8 (L) 1.2 - 3.5      Bilirubin, total 0.9 0.2 - 1.1 MG/DL    Bilirubin, direct 0.2 <0.4 MG/DL    Alk.  phosphatase 151 (H) 50 - 136 U/L    AST (SGOT) 171 (H) 15 - 37 U/L    ALT (SGPT) 377 (H) 12 - 65 U/L        All Micro Results     None          Current Meds:  Current Facility-Administered Medications   Medication Dose Route Frequency    lidocaine (XYLOCAINE) 10 mg/mL (1 %) injection 0.1 mL  0.1 mL SubCUTAneous PRN    lactated Ringers infusion  100 mL/hr IntraVENous CONTINUOUS    fentaNYL citrate (PF) injection 100 mcg  100 mcg IntraVENous ONCE    midazolam (VERSED) injection 2 mg  2 mg IntraVENous ONCE PRN    midazolam (VERSED) injection 2 mg  2 mg IntraVENous ONCE    lactated Ringers infusion  100 mL/hr IntraVENous CONTINUOUS    oxyCODONE IR (ROXICODONE) tablet 5 mg  5 mg Oral ONCE PRN    HYDROmorphone (PF) (DILAUDID) injection 0.5 mg  0.5 mg IntraVENous Multiple    naloxone (NARCAN) injection 0.04 mg  0.04 mg IntraVENous EVERY 2 MINUTES AS NEEDED    promethazine (PHENERGAN) with saline injection 6.25 mg  6.25 mg IntraVENous Q15MIN PRN    glucagon (GLUCAGEN) injection 0.5-1 mg  0.5-1 mg IntraVENous ENDO ONCE    sodium chloride (NS) flush 5-40 mL  5-40 mL IntraVENous Q8H    sodium chloride (NS) flush 5-40 mL  5-40 mL IntraVENous PRN    HYDROmorphone (PF) (DILAUDID) injection 0.5 mg  0.5 mg IntraVENous Q4H PRN    ondansetron (ZOFRAN) injection 4 mg  4 mg IntraVENous Q4H PRN    heparin (porcine) injection 5,000 Units  5,000 Units SubCUTAneous Q8H    famotidine (PF) (PEPCID) 20 mg in 0.9% sodium chloride 10 mL injection  20 mg IntraVENous Q12H    diphenhydrAMINE (BENADRYL) capsule 25 mg  25 mg Oral Q6H PRN    oxyCODONE IR (ROXICODONE) tablet 5 mg  5 mg Oral Q4H PRN    senna-docusate (PERICOLACE) 8.6-50 mg per tablet 1 Tab  1 Tab Oral DAILY    naloxone (NARCAN) injection 1 mg  1 mg IntraVENous DAILY PRN    lactated Ringers infusion  150 mL/hr IntraVENous CONTINUOUS     Facility-Administered Medications Ordered in Other Encounters   Medication Dose Route Frequency    lactated Ringers infusion    CONTINUOUS    propofoL (DIPRIVAN) 10 mg/mL injection   IntraVENous PRN    rocuronium injection    PRN    succinylcholine (ANECTINE) injection    PRN    ondansetron (ZOFRAN) injection    PRN    dexamethasone (DECADRON) 4 mg/mL injection    PRN    lidocaine (PF) (XYLOCAINE) 20 mg/mL (2 %) injection   IntraVENous PRN    PHENYLephrine 100 mcg/mL 10 mL syringe (ONE-STEP)   IntraVENous CONTINUOUS         Other Studies:  Mri Abd Wo Cont    Result Date: 7/12/2020  MRI of the Abdomen INDICATION: Pancreatitis TECHNIQUE:  Routine axial and coronal MR sequences were obtained through the abdomen. MRCP sequences were included. FINDINGS: The common bile duct measures 4 mm diameter. There are 2 or 3 small filling defects in the common duct just proximal to the ampulla. Pancreatic duct is normal in caliber. Gallbladder is absent. There are no focal masses in the liver, spleen, or pancreas. The kidneys and adrenal glands are also unremarkable. There is no hydronephrosis. There are no inflammatory changes or fluid collections in the abdomen. IMPRESSION:  Multiple common duct stones. No significant bile duct dilatation. ** If there are any questions about this report, I can be reached on [employer] or at [phone] **     [address] [facility]    Result Date: 7/12/2020  EXAM: Limited abdomen ultrasound. INDICATION: Elevated liver enzymes. COMPARISON: None. TECHNIQUE: Standard protocol limited right upper quadrant abdomen ultrasound. FINDINGS: - Liver: Within normal limits. - Gallbladder: There has been a prior cholecystectomy. No fluid is seen in the gallbladder fossa. - Bile ducts: Within normal limits. The common bile duct measures 6.3 mm. - Pancreas:  The pancreas was obscured by overlying bowel gas. - Right kidney: Within normal limits. - Aorta and IVC: Within normal limits. - Portal vein: Within normal limits. - Other: No ascites. IMPRESSION: Unremarkable study, in this postcholecystectomy patient. Assessment and Plan: Active Hospital Problems    Diagnosis Date Noted    Hyperbilirubinemia 07/13/2020    Elevated liver enzymes 07/13/2020    Choledocholithiasis 07/13/2020    Pancreatitis 07/11/2020    Transaminitis 07/11/2020    S/P cholecystectomy 07/01/2020       Plan:  Acute Pancreatitis likely due to choledocholithiasis  Elevated LFTs and Hyperbilirubinemia   Recent calculus cholecystitis s/p lap choley (6/17). Liapse of 1500 on admission. MRCP as above  - ERCP with GI today. - pain and nausea control  - NPO  - IVF    Tobacco Use: smoking cessation discussed. Continue with nicotine patch    Morbid Obesity: Body mass index is 34.8 kg/m². Diet:  DIET NPO  DVT PPx: heparin sq  Code: Full Code  Dispo: home  Estimated Discharge: TBD based on clinical course    Labs/Imaging Reviewed. Patient is high risk due to current condition and comorbid conditions as well as requiring frequent monitoring. Plan discussed with staff, patient/family and are in agreement. Time Spent: Greater than 45 minutes was spent in reviewing charts, physical exam, discussion with patient, and answered any questions.     Signed By: Anibal Lancaster MD     July 13, 2020

## 2020-07-13 NOTE — PROGRESS NOTES
END OF SHIFT NOTE:    INTAKE/OUTPUT  07/12 0701 - 07/13 0700  In: 5409 [I.V.:3384]  Out: 3800 [Urine:3800]  Voiding: YES  Catheter: NO  Drain:      Flatus: Patient does have flatus present. Stool:  0 occurrences. Characteristics:       Emesis: 0 occurrences. Characteristics:        VITAL SIGNS  Patient Vitals for the past 12 hrs:   Temp Pulse Resp BP SpO2   07/13/20 1928 98.1 °F (36.7 °C) 91 16 98/69 100 %   07/13/20 1535  84 16 108/72 98 %   07/13/20 1400  94 17  97 %   07/13/20 1357 98 °F (36.7 °C) 98 18 148/84 98 %   07/13/20 1351  95 17 146/83 97 %   07/13/20 1346  (!) 101 16 140/80 98 %   07/13/20 1341  97 17 140/80 96 %   07/13/20 1336  97 18 141/80 98 %   07/13/20 1331  94 16 138/76 100 %   07/13/20 1330 98.6 °F (37 °C) 99 22 137/70 98 %   07/13/20 1327  (!) 104 17 137/70 100 %   07/13/20 1240  84 18 120/70 100 %   07/13/20 1145 98.3 °F (36.8 °C) 78 18 111/59 100 %       Pain Assessment  Pain Intensity 1: 2 (07/13/20 1559)  Pain Location 1: Back, Throat  Pain Intervention(s) 1: Medication (see MAR)  Patient Stated Pain Goal: 0    Ambulating  Yes, up ad melissa. Shift report given to oncoming nurse at the bedside.     Sandra Grijalva RN

## 2020-07-13 NOTE — ANESTHESIA POSTPROCEDURE EVALUATION
Procedure(s):  ENDOSCOPIC RETROGRADE CHOLANGIOPANCREATOGRAPHY (ERCP)  ENDOSCOPIC SPHINCTEROTOMY  ENDOSCOPIC STONE EXTRACTION/BALLOON SWEEP. general    Anesthesia Post Evaluation        Patient location during evaluation: PACU  Patient participation: complete - patient participated  Level of consciousness: awake  Pain management: satisfactory to patient  Airway patency: patent  Anesthetic complications: no  Cardiovascular status: hemodynamically stable  Respiratory status: spontaneous ventilation  Hydration status: euvolemic  Post anesthesia nausea and vomiting:  controlled      INITIAL Post-op Vital signs:   Vitals Value Taken Time   /84 7/13/2020  1:57 PM   Temp 37 °C (98.6 °F) 7/13/2020  1:30 PM   Pulse 98 7/13/2020  1:57 PM   Resp 17 7/13/2020  1:51 PM   SpO2 98 % 7/13/2020  1:57 PM   Vitals shown include unvalidated device data.

## 2020-07-13 NOTE — ANESTHESIA PREPROCEDURE EVALUATION
Relevant Problems   No relevant active problems       Anesthetic History               Review of Systems / Medical History  Patient summary reviewed and pertinent labs reviewed    Pulmonary          Smoker  Asthma : well controlled       Neuro/Psych         Headaches     Cardiovascular  Within defined limits                Exercise tolerance: >4 METS     GI/Hepatic/Renal     GERD: well controlled          Comments: Elevated LFTs Endo/Other        Obesity     Other Findings            Physical Exam    Airway  Mallampati: II  TM Distance: 4 - 6 cm  Neck ROM: normal range of motion   Mouth opening: Normal     Cardiovascular  Regular rate and rhythm,  S1 and S2 normal,  no murmur, click, rub, or gallop             Dental         Pulmonary  Breath sounds clear to auscultation               Abdominal         Other Findings            Anesthetic Plan    ASA: 3  Anesthesia type: general          Induction: Intravenous  Anesthetic plan and risks discussed with: Patient

## 2020-07-13 NOTE — PROGRESS NOTES
TRANSFER - IN REPORT:    Verbal report received from Amandeep(name) on [facility]  being received from PACU(unit) for routine progression of care      Report consisted of patients Situation, Background, Assessment and   Recommendations(SBAR). Information from the following report(s) SBAR, Kardex, OR Summary, Procedure Summary, MAR and Recent Results was reviewed with the receiving nurse. Opportunity for questions and clarification was provided. Assessment completed upon patients arrival to unit and care assumed.

## 2020-07-14 VITALS
TEMPERATURE: 98.1 F | SYSTOLIC BLOOD PRESSURE: 113 MMHG | WEIGHT: 198 LBS | DIASTOLIC BLOOD PRESSURE: 83 MMHG | OXYGEN SATURATION: 97 % | HEART RATE: 86 BPM | BODY MASS INDEX: 35.08 KG/M2 | RESPIRATION RATE: 16 BRPM | HEIGHT: 63 IN

## 2020-07-14 LAB
ALBUMIN SERPL-MCNC: 2.7 G/DL (ref 3.5–5)
ALBUMIN/GLOB SERPL: 0.9 {RATIO} (ref 1.2–3.5)
ALP SERPL-CCNC: 125 U/L (ref 50–136)
ALT SERPL-CCNC: 245 U/L (ref 12–65)
ANION GAP SERPL CALC-SCNC: 6 MMOL/L (ref 7–16)
AST SERPL-CCNC: 69 U/L (ref 15–37)
BASOPHILS # BLD: 0 K/UL (ref 0–0.2)
BASOPHILS NFR BLD: 0 % (ref 0–2)
BILIRUB DIRECT SERPL-MCNC: 0.2 MG/DL
BILIRUB SERPL-MCNC: 0.4 MG/DL (ref 0.2–1.1)
BUN SERPL-MCNC: 6 MG/DL (ref 6–23)
CALCIUM SERPL-MCNC: 7.8 MG/DL (ref 8.3–10.4)
CHLORIDE SERPL-SCNC: 115 MMOL/L (ref 98–107)
CO2 SERPL-SCNC: 23 MMOL/L (ref 21–32)
CREAT SERPL-MCNC: 0.48 MG/DL (ref 0.6–1)
DIFFERENTIAL METHOD BLD: ABNORMAL
EOSINOPHIL # BLD: 0.5 K/UL (ref 0–0.8)
EOSINOPHIL NFR BLD: 11 % (ref 0.5–7.8)
ERYTHROCYTE [DISTWIDTH] IN BLOOD BY AUTOMATED COUNT: 12.5 % (ref 11.9–14.6)
GLOBULIN SER CALC-MCNC: 3 G/DL (ref 2.3–3.5)
GLUCOSE SERPL-MCNC: 80 MG/DL (ref 65–100)
HCT VFR BLD AUTO: 30.7 % (ref 35.8–46.3)
HGB BLD-MCNC: 10.5 G/DL (ref 11.7–15.4)
IMM GRANULOCYTES # BLD AUTO: 0 K/UL (ref 0–0.5)
IMM GRANULOCYTES NFR BLD AUTO: 1 % (ref 0–5)
LYMPHOCYTES # BLD: 1.7 K/UL (ref 0.5–4.6)
LYMPHOCYTES NFR BLD: 41 % (ref 13–44)
MCH RBC QN AUTO: 32.2 PG (ref 26.1–32.9)
MCHC RBC AUTO-ENTMCNC: 34.2 G/DL (ref 31.4–35)
MCV RBC AUTO: 94.2 FL (ref 79.6–97.8)
MONOCYTES # BLD: 0.3 K/UL (ref 0.1–1.3)
MONOCYTES NFR BLD: 8 % (ref 4–12)
NEUTS SEG # BLD: 1.7 K/UL (ref 1.7–8.2)
NEUTS SEG NFR BLD: 40 % (ref 43–78)
NRBC # BLD: 0 K/UL (ref 0–0.2)
PLATELET # BLD AUTO: 159 K/UL (ref 150–450)
PMV BLD AUTO: 10.1 FL (ref 9.4–12.3)
POTASSIUM SERPL-SCNC: 3.7 MMOL/L (ref 3.5–5.1)
PROT SERPL-MCNC: 5.7 G/DL (ref 6.3–8.2)
RBC # BLD AUTO: 3.26 M/UL (ref 4.05–5.2)
SODIUM SERPL-SCNC: 144 MMOL/L (ref 136–145)
WBC # BLD AUTO: 4.2 K/UL (ref 4.3–11.1)

## 2020-07-14 PROCEDURE — 85025 COMPLETE CBC W/AUTO DIFF WBC: CPT

## 2020-07-14 PROCEDURE — 80076 HEPATIC FUNCTION PANEL: CPT

## 2020-07-14 PROCEDURE — 74011250636 HC RX REV CODE- 250/636: Performed by: FAMILY MEDICINE

## 2020-07-14 PROCEDURE — 36415 COLL VENOUS BLD VENIPUNCTURE: CPT

## 2020-07-14 PROCEDURE — 80048 BASIC METABOLIC PNL TOTAL CA: CPT

## 2020-07-14 PROCEDURE — 74011250636 HC RX REV CODE- 250/636: Performed by: ANESTHESIOLOGY

## 2020-07-14 PROCEDURE — 74011250636 HC RX REV CODE- 250/636: Performed by: INTERNAL MEDICINE

## 2020-07-14 PROCEDURE — 74011000250 HC RX REV CODE- 250: Performed by: INTERNAL MEDICINE

## 2020-07-14 RX ORDER — ONDANSETRON 4 MG/1
4 TABLET, FILM COATED ORAL
Qty: 15 TAB | Refills: 0 | Status: SHIPPED | OUTPATIENT
Start: 2020-07-14

## 2020-07-14 RX ORDER — OXYCODONE HYDROCHLORIDE 5 MG/1
5 TABLET ORAL
Qty: 8 TAB | Refills: 0 | Status: SHIPPED | OUTPATIENT
Start: 2020-07-14 | End: 2020-07-16

## 2020-07-14 RX ORDER — FAMOTIDINE 20 MG/1
20 TABLET, FILM COATED ORAL EVERY 12 HOURS
Status: DISCONTINUED | OUTPATIENT
Start: 2020-07-14 | End: 2020-07-14 | Stop reason: HOSPADM

## 2020-07-14 RX ADMIN — FAMOTIDINE 20 MG: 10 INJECTION INTRAVENOUS at 08:20

## 2020-07-14 RX ADMIN — SODIUM CHLORIDE, SODIUM LACTATE, POTASSIUM CHLORIDE, AND CALCIUM CHLORIDE 100 ML/HR: 600; 310; 30; 20 INJECTION, SOLUTION INTRAVENOUS at 03:17

## 2020-07-14 RX ADMIN — HEPARIN SODIUM 5000 UNITS: 5000 INJECTION INTRAVENOUS; SUBCUTANEOUS at 08:20

## 2020-07-14 NOTE — DISCHARGE SUMMARY
Hospitalist Discharge Summary     Admit Date:  2020  9:40 PM   Name:  Uriel Lee   Age:  36 y.o.  :  1985   MRN:  [id number]   PCP:  Rose Car MD  Treatment Team: Attending Provider: Pamela Gerber MD; Consulting Provider: Parisa Benítez MD; Care Manager: Ashley Kaur    Problem List for this Hospitalization:  Active Hospital Problems    Diagnosis Date Noted    Hyperbilirubinemia 2020    Elevated liver enzymes 2020    Choledocholithiasis 2020    Pancreatitis 2020    Transaminitis 2020    S/P cholecystectomy 2020         Hospital Course :  Please refer to the admission H&P for details of presentation. In summary,  36 y.o. female with medical history only significant for recent lap germain on  who presented to ED with continued epigastric abdominal pain and nausea vomiting. Lipase was noted to be 1500 with T bili of 1.7 and ALP of 192. Was admitted for pancreatitis. Patient underwent MRCP which to multiple common duct stones and significant bile duct agitation. ERCP was done on  with removal for 2 stones and sphincterotomy. Post ERCP patient is tolerating diet with minimal pain not requiring any pain medication for the past 12 hours. She is tolerating diet. Patient is medically stable to be discharged. Patient is to follow-up with PCP as needed. Disposition: Home  Activity: Activity as tolerated  Diet: DIET REGULAR  Code Status: Full Code      Follow up instructions, discharge meds at bottom of this note. Plan was discussed with patient/family. All questions answered. Patient was stable at time of discharge. Patient will call a physician or return if any concerns. Diagnostic Imaging/Tests:   Mri Abd Wo Cont    Result Date: 2020  MRI of the Abdomen INDICATION: Pancreatitis TECHNIQUE:  Routine axial and coronal MR sequences were obtained through the abdomen. MRCP sequences were included.  FINDINGS: The common bile duct measures 4 mm diameter. There are 2 or 3 small filling defects in the common duct just proximal to the ampulla. Pancreatic duct is normal in caliber. Gallbladder is absent. There are no focal masses in the liver, spleen, or pancreas. The kidneys and adrenal glands are also unremarkable. There is no hydronephrosis. There are no inflammatory changes or fluid collections in the abdomen. IMPRESSION:  Multiple common duct stones. No significant bile duct dilatation. ** If there are any questions about this report, I can be reached on [employer] or at [phone] **     [address] [facility]    Result Date: 7/12/2020  EXAM: Limited abdomen ultrasound. INDICATION: Elevated liver enzymes. COMPARISON: None. TECHNIQUE: Standard protocol limited right upper quadrant abdomen ultrasound. FINDINGS: - Liver: Within normal limits. - Gallbladder: There has been a prior cholecystectomy. No fluid is seen in the gallbladder fossa. - Bile ducts: Within normal limits. The common bile duct measures 6.3 mm. - Pancreas: The pancreas was obscured by overlying bowel gas. - Right kidney: Within normal limits. - Aorta and IVC: Within normal limits. - Portal vein: Within normal limits. - Other: No ascites. IMPRESSION: Unremarkable study, in this postcholecystectomy patient. Echocardiogram results:  No results found for this visit on 07/11/20.     Procedures done this admission:  Procedure(s):  ENDOSCOPIC RETROGRADE CHOLANGIOPANCREATOGRAPHY (ERCP)  ENDOSCOPIC SPHINCTEROTOMY  ENDOSCOPIC STONE EXTRACTION/BALLOON SWEEP    All Micro Results     None          Labs: Results:       BMP, Mg, Phos Recent Labs     07/14/20 0529 07/13/20 0437 07/12/20  0525    139 140   K 3.7 3.5 3.6   * 109* 109*   CO2 23 23 25   AGAP 6* 7 6*   BUN 6 7 9   CREA 0.48* 0.53* 0.62   CA 7.8* 8.1* 8.4   GLU 80 58* 85      CBC Recent Labs     07/14/20  0529 07/13/20  0437 07/12/20  0525 07/11/20 2056   WBC 4.2* 5.1 4.9 8.7   RBC 3.26* 3.68* 3.69* 4.32 HGB 10.5* 11.2* 11.8 13.1   HCT 30.7* 35.3* 34.3* 40.3    158 189 215   GRANS 40* 46  --  65   LYMPH 41 35  --  24   EOS 11* 13*  --  3   MONOS 8 6  --  7   BASOS 0 0  --  1   IG 1 1  --  0   ANEU 1.7 2.3  --  5.7   ABL 1.7 1.8  --  2.1   LIDIA 0.5 0.6  --  0.3   ABM 0.3 0.3  --  0.6   ABB 0.0 0.0  --  0.0   AIG 0.0 0.0  --  0.0      LFT Recent Labs     07/14/20  0529 07/13/20  0437 07/12/20  0525   * 377* 569*    151* 177*   TP 5.7* 6.1* 6.4   ALB 2.7* 2.8* 3.0*   GLOB 3.0 3.3 3.4   AGRAT 0.9* 0.8* 0.9*      Cardiac Testing Lab Results   Component Value Date/Time    Troponin-I, Qt. <0.02 (L) 05/08/2017 11:05 AM      Coagulation Tests No results found for: PTP, INR, APTT, INREXT, INREXT   A1c No results found for: HBA1C, HGBE8, SPF4PKYV, ZWN9EDJA   Lipid Panel Lab Results   Component Value Date/Time    Triglyceride 64 07/12/2020 05:25 AM      Thyroid Panel Lab Results   Component Value Date/Time    TSH 0.438 03/18/2012 07:10 PM        Most Recent UA Lab Results   Component Value Date/Time    WBC 20-50 11/29/2018 11:51 AM    RBC 10-20 11/29/2018 11:51 AM    Epithelial cells 10-20 11/29/2018 11:51 AM    Bacteria 0 11/29/2018 11:51 AM    Casts 0-3 11/29/2018 11:51 AM    Crystals, urine 0 02/06/2017 02:04 PM    Mucus 0 02/06/2017 02:04 PM    Other observations RESULTS VERIFIED MANUALLY 02/06/2017 02:04 PM        Allergies   Allergen Reactions    Percocet [Oxycodone-Acetaminophen] Nausea and Vomiting     Immunization History   Administered Date(s) Administered    TD Vaccine 06/29/2009       All Labs from Last 24 Hrs:  Recent Results (from the past 24 hour(s))   CBC WITH AUTOMATED DIFF    Collection Time: 07/14/20  5:29 AM   Result Value Ref Range    WBC 4.2 (L) 4.3 - 11.1 K/uL    RBC 3.26 (L) 4.05 - 5.2 M/uL    HGB 10.5 (L) 11.7 - 15.4 g/dL    HCT 30.7 (L) 35.8 - 46.3 %    MCV 94.2 79.6 - 97.8 FL    MCH 32.2 26.1 - 32.9 PG    MCHC 34.2 31.4 - 35.0 g/dL    RDW 12.5 11.9 - 14.6 %    PLATELET 677 666 - 450 K/uL    MPV 10.1 9.4 - 12.3 FL    ABSOLUTE NRBC 0.00 0.0 - 0.2 K/uL    DF AUTOMATED      NEUTROPHILS 40 (L) 43 - 78 %    LYMPHOCYTES 41 13 - 44 %    MONOCYTES 8 4.0 - 12.0 %    EOSINOPHILS 11 (H) 0.5 - 7.8 %    BASOPHILS 0 0.0 - 2.0 %    IMMATURE GRANULOCYTES 1 0.0 - 5.0 %    ABS. NEUTROPHILS 1.7 1.7 - 8.2 K/UL    ABS. LYMPHOCYTES 1.7 0.5 - 4.6 K/UL    ABS. MONOCYTES 0.3 0.1 - 1.3 K/UL    ABS. EOSINOPHILS 0.5 0.0 - 0.8 K/UL    ABS. BASOPHILS 0.0 0.0 - 0.2 K/UL    ABS. IMM. GRANS. 0.0 0.0 - 0.5 K/UL   METABOLIC PANEL, BASIC    Collection Time: 07/14/20  5:29 AM   Result Value Ref Range    Sodium 144 136 - 145 mmol/L    Potassium 3.7 3.5 - 5.1 mmol/L    Chloride 115 (H) 98 - 107 mmol/L    CO2 23 21 - 32 mmol/L    Anion gap 6 (L) 7 - 16 mmol/L    Glucose 80 65 - 100 mg/dL    BUN 6 6 - 23 MG/DL    Creatinine 0.48 (L) 0.6 - 1.0 MG/DL    GFR est AA >60 >60 ml/min/1.73m2    GFR est non-AA >60 >60 ml/min/1.73m2    Calcium 7.8 (L) 8.3 - 10.4 MG/DL   HEPATIC FUNCTION PANEL    Collection Time: 07/14/20  5:29 AM   Result Value Ref Range    Protein, total 5.7 (L) 6.3 - 8.2 g/dL    Albumin 2.7 (L) 3.5 - 5.0 g/dL    Globulin 3.0 2.3 - 3.5 g/dL    A-G Ratio 0.9 (L) 1.2 - 3.5      Bilirubin, total 0.4 0.2 - 1.1 MG/DL    Bilirubin, direct 0.2 <0.4 MG/DL    Alk.  phosphatase 125 50 - 136 U/L    AST (SGOT) 69 (H) 15 - 37 U/L    ALT (SGPT) 245 (H) 12 - 65 U/L       Current Med List in Hospital:   Current Facility-Administered Medications   Medication Dose Route Frequency    famotidine (PEPCID) tablet 20 mg  20 mg Oral Q12H    lidocaine (XYLOCAINE) 10 mg/mL (1 %) injection 0.1 mL  0.1 mL SubCUTAneous PRN    lactated Ringers infusion  100 mL/hr IntraVENous CONTINUOUS    HYDROmorphone (PF) (DILAUDID) injection 0.5 mg  0.5 mg IntraVENous Multiple    naloxone (NARCAN) injection 0.04 mg  0.04 mg IntraVENous EVERY 2 MINUTES AS NEEDED    promethazine (PHENERGAN) with saline injection 6.25 mg  6.25 mg IntraVENous Q15MIN PRN    sodium chloride (NS) flush 5-40 mL  5-40 mL IntraVENous Q8H    sodium chloride (NS) flush 5-40 mL  5-40 mL IntraVENous PRN    HYDROmorphone (PF) (DILAUDID) injection 0.5 mg  0.5 mg IntraVENous Q4H PRN    ondansetron (ZOFRAN) injection 4 mg  4 mg IntraVENous Q4H PRN    heparin (porcine) injection 5,000 Units  5,000 Units SubCUTAneous Q8H    diphenhydrAMINE (BENADRYL) capsule 25 mg  25 mg Oral Q6H PRN    oxyCODONE IR (ROXICODONE) tablet 5 mg  5 mg Oral Q4H PRN    senna-docusate (PERICOLACE) 8.6-50 mg per tablet 1 Tab  1 Tab Oral DAILY    naloxone (NARCAN) injection 1 mg  1 mg IntraVENous DAILY PRN    lactated Ringers infusion  150 mL/hr IntraVENous CONTINUOUS       Discharge Exam:  Patient Vitals for the past 24 hrs:   Temp Pulse Resp BP SpO2   07/14/20 1057 98.1 °F (36.7 °C) 86 16 113/83 97 %   07/14/20 0708 98.6 °F (37 °C) 61 16 114/72 100 %   07/14/20 0325 98.1 °F (36.7 °C) 69 16 97/70 98 %   07/13/20 2316 98.1 °F (36.7 °C) 72 16 107/67 97 %   07/13/20 1928 98.1 °F (36.7 °C) 91 16 98/69 100 %   07/13/20 1535  84 16 108/72 98 %   07/13/20 1400  94 17  97 %   07/13/20 1357 98 °F (36.7 °C) 98 18 148/84 98 %   07/13/20 1351  95 17 146/83 97 %   07/13/20 1346  (!) 101 16 140/80 98 %   07/13/20 1341  97 17 140/80 96 %   07/13/20 1336  97 18 141/80 98 %   07/13/20 1331  94 16 138/76 100 %   07/13/20 1330 98.6 °F (37 °C) 99 22 137/70 98 %   07/13/20 1327  (!) 104 17 137/70 100 %   07/13/20 1240  84 18 120/70 100 %     Oxygen Therapy  O2 Sat (%): 97 % (07/14/20 1057)  Pulse via Oximetry: 94 beats per minute (07/13/20 1400)  O2 Device: Room air (07/13/20 2000)  O2 Flow Rate (L/min): 2 l/min (07/13/20 1327)    Estimated body mass index is 34.8 kg/m² as calculated from the following:    Height as of this encounter: 5' 3.25\" (1.607 m). Weight as of this encounter: 89.8 kg (198 lb).       Intake/Output Summary (Last 24 hours) at 7/14/2020 1226  Last data filed at 7/14/2020 0833  Gross per 24 hour   Intake 3673 ml Output 1900 ml   Net 1773 ml       *Note that automatically entered I/Os may not be accurate; dependent on patient compliance with collection and accurate  by assistants. Physical Exam:   General:                     alert, awake, no acute distress. Well nourished. Obese  Head:                          normocephalic, atraumatic  Eyes, Ears, nose:      PERRL, EOMI. Normal conjunctiva  Neck:                          supple, non-tender. Trachea midline. Lungs:                        CTAB, no wheezing, rhonchi, rales  Cardiac:                      RRR, Normal S1 and S2. Abdomen:                  Soft, non distended, nontender, +BS  Extremities:               Warm, dry. No edema   Skin:                           No rashes, no jaundice  Neuro:              AAOx3. No gross focal neurological deficit  Psychiatric:                No anxiety, calm, cooperative      Discharge Info:   Current Discharge Medication List      START taking these medications    Details   oxyCODONE IR (ROXICODONE) 5 mg immediate release tablet Take 1 Tab by mouth every six (6) hours as needed for Pain for up to 2 days. Max Daily Amount: 20 mg.  Qty: 8 Tab, Refills: 0    Associated Diagnoses: Acute biliary pancreatitis without infection or necrosis         CONTINUE these medications which have CHANGED    Details   ondansetron hcl (ZOFRAN) 4 mg tablet Take 1 Tab by mouth every six (6) hours as needed for Nausea. Qty: 15 Tab, Refills: 0         CONTINUE these medications which have NOT CHANGED    Details   sucralfate (Carafate) 100 mg/mL suspension Take 2 g by mouth four (4) times daily. omeprazole (PRILOSEC) 40 mg capsule Take 40 mg by mouth daily. amLODIPine (NORVASC) 5 mg tablet Take 5 mg by mouth daily. albuterol (VENTOLIN HFA) 90 mcg/actuation inhaler Take 2 Puffs by inhalation every four (4) hours as needed for Wheezing or Shortness of Breath.   Qty: 1 Inhaler, Refills: 2             Follow Up Orders:  No orders of the defined types were placed in this encounter. Follow-up Information     Follow up With Specialties Details Why Contact Info    PCP   As needed           Time spent in patient discharge planning and coordination 40 minutes.     Signed:  Bruno Keane MD

## 2020-07-14 NOTE — PROGRESS NOTES
Name: Corky Flores MRN: [id number]  : 1985  Age:34 y.o.  female  Admit Date:  2020 LOS: 3      Hospitalist Progress Note    Corky Flores is a 36 y.o. female with medical history only significant for recent lap germain on  who presented to ED with continued epigastric abdominal pain and nausea vomiting. Lipase was noted to be 1500 with T bili of 1.7 and ALP of 192. MRCP showed multiple common duct stones with a significant bile duct dilatation. Plan for ERCP on . Subjective (20) :  Patient is seen and examined at bedside. No acute events reported overnight by nursing staff. Patient reports abdominal pain has resolved. Tolerating liquid diet this AM without any issues. Patient denies fever, chills, chest pains, shortness of breath, n/v, abdominal pain. ROS: 10 point review of systems is otherwise negative with the exception of the elements mentioned above.     Objective:    Patient Vitals for the past 24 hrs:   Temp Pulse Resp BP SpO2   20 1057 98.1 °F (36.7 °C) 86 16 113/83 97 %   20 0708 98.6 °F (37 °C) 61 16 114/72 100 %   20 0325 98.1 °F (36.7 °C) 69 16 97/70 98 %   20 2316 98.1 °F (36.7 °C) 72 16 107/67 97 %   20 1928 98.1 °F (36.7 °C) 91 16 98/69 100 %   20 1535  84 16 108/72 98 %   20 1400  94 17  97 %   20 1357 98 °F (36.7 °C) 98 18 148/84 98 %   20 1351  95 17 146/83 97 %   20 1346  (!) 101 16 140/80 98 %   20 1341  97 17 140/80 96 %   20 1336  97 18 141/80 98 %   20 1331  94 16 138/76 100 %   20 1330 98.6 °F (37 °C) 99 22 137/70 98 %   20 1327  (!) 104 17 137/70 100 %   20 1240  84 18 120/70 100 %     Oxygen Therapy  O2 Sat (%): 97 % (20 1057)  Pulse via Oximetry: 94 beats per minute (20 1400)  O2 Device: Room air (20 2000)  O2 Flow Rate (L/min): 2 l/min (20 1327)    Estimated body mass index is 34.8 kg/m² as calculated from the following:    Height as of this encounter: 5' 3.25\" (1.607 m). Weight as of this encounter: 89.8 kg (198 lb). Intake/Output Summary (Last 24 hours) at 7/14/2020 1145  Last data filed at 7/14/2020 3832  Gross per 24 hour   Intake 3673 ml   Output 1900 ml   Net 1773 ml       *Note that automatically entered I/Os may not be accurate; dependent on patient compliance with collection and accurate  by techs. Physical Exam:   General:     alert, awake, no acute distress. Well nourished. Obese  Head:   normocephalic, atraumatic  Eyes, Ears, nose: PERRL, EOMI. Normal conjunctiva  Neck:    supple, non-tender. Trachea midline. Lungs:   CTAB, no wheezing, rhonchi, rales  Cardiac:   RRR, Normal S1 and S2. Abdomen:   Soft, non distended, nontender, +BS  Extremities:   Warm, dry. No edema   Skin:   No rashes, no jaundice  Neuro:  AAOx3. No gross focal neurological deficit  Psychiatric:  No anxiety, calm, cooperative    Data Review:  I have reviewed all labs, meds, and studies from the last 24 hours:    Recent Results (from the past 24 hour(s))   CBC WITH AUTOMATED DIFF    Collection Time: 07/14/20  5:29 AM   Result Value Ref Range    WBC 4.2 (L) 4.3 - 11.1 K/uL    RBC 3.26 (L) 4.05 - 5.2 M/uL    HGB 10.5 (L) 11.7 - 15.4 g/dL    HCT 30.7 (L) 35.8 - 46.3 %    MCV 94.2 79.6 - 97.8 FL    MCH 32.2 26.1 - 32.9 PG    MCHC 34.2 31.4 - 35.0 g/dL    RDW 12.5 11.9 - 14.6 %    PLATELET 194 603 - 712 K/uL    MPV 10.1 9.4 - 12.3 FL    ABSOLUTE NRBC 0.00 0.0 - 0.2 K/uL    DF AUTOMATED      NEUTROPHILS 40 (L) 43 - 78 %    LYMPHOCYTES 41 13 - 44 %    MONOCYTES 8 4.0 - 12.0 %    EOSINOPHILS 11 (H) 0.5 - 7.8 %    BASOPHILS 0 0.0 - 2.0 %    IMMATURE GRANULOCYTES 1 0.0 - 5.0 %    ABS. NEUTROPHILS 1.7 1.7 - 8.2 K/UL    ABS. LYMPHOCYTES 1.7 0.5 - 4.6 K/UL    ABS. MONOCYTES 0.3 0.1 - 1.3 K/UL    ABS. EOSINOPHILS 0.5 0.0 - 0.8 K/UL    ABS. BASOPHILS 0.0 0.0 - 0.2 K/UL    ABS. IMM.  GRANS. 0.0 0.0 - 0.5 K/UL   METABOLIC PANEL, BASIC Collection Time: 07/14/20  5:29 AM   Result Value Ref Range    Sodium 144 136 - 145 mmol/L    Potassium 3.7 3.5 - 5.1 mmol/L    Chloride 115 (H) 98 - 107 mmol/L    CO2 23 21 - 32 mmol/L    Anion gap 6 (L) 7 - 16 mmol/L    Glucose 80 65 - 100 mg/dL    BUN 6 6 - 23 MG/DL    Creatinine 0.48 (L) 0.6 - 1.0 MG/DL    GFR est AA >60 >60 ml/min/1.73m2    GFR est non-AA >60 >60 ml/min/1.73m2    Calcium 7.8 (L) 8.3 - 10.4 MG/DL   HEPATIC FUNCTION PANEL    Collection Time: 07/14/20  5:29 AM   Result Value Ref Range    Protein, total 5.7 (L) 6.3 - 8.2 g/dL    Albumin 2.7 (L) 3.5 - 5.0 g/dL    Globulin 3.0 2.3 - 3.5 g/dL    A-G Ratio 0.9 (L) 1.2 - 3.5      Bilirubin, total 0.4 0.2 - 1.1 MG/DL    Bilirubin, direct 0.2 <0.4 MG/DL    Alk.  phosphatase 125 50 - 136 U/L    AST (SGOT) 69 (H) 15 - 37 U/L    ALT (SGPT) 245 (H) 12 - 65 U/L        All Micro Results     None          Current Meds:  Current Facility-Administered Medications   Medication Dose Route Frequency    famotidine (PEPCID) tablet 20 mg  20 mg Oral Q12H    lidocaine (XYLOCAINE) 10 mg/mL (1 %) injection 0.1 mL  0.1 mL SubCUTAneous PRN    lactated Ringers infusion  100 mL/hr IntraVENous CONTINUOUS    midazolam (VERSED) injection 2 mg  2 mg IntraVENous ONCE PRN    lactated Ringers infusion  100 mL/hr IntraVENous CONTINUOUS    HYDROmorphone (PF) (DILAUDID) injection 0.5 mg  0.5 mg IntraVENous Multiple    naloxone (NARCAN) injection 0.04 mg  0.04 mg IntraVENous EVERY 2 MINUTES AS NEEDED    promethazine (PHENERGAN) with saline injection 6.25 mg  6.25 mg IntraVENous Q15MIN PRN    sodium chloride (NS) flush 5-40 mL  5-40 mL IntraVENous Q8H    sodium chloride (NS) flush 5-40 mL  5-40 mL IntraVENous PRN    HYDROmorphone (PF) (DILAUDID) injection 0.5 mg  0.5 mg IntraVENous Q4H PRN    ondansetron (ZOFRAN) injection 4 mg  4 mg IntraVENous Q4H PRN    heparin (porcine) injection 5,000 Units  5,000 Units SubCUTAneous Q8H    diphenhydrAMINE (BENADRYL) capsule 25 mg  25 mg Oral Q6H PRN    oxyCODONE IR (ROXICODONE) tablet 5 mg  5 mg Oral Q4H PRN    senna-docusate (PERICOLACE) 8.6-50 mg per tablet 1 Tab  1 Tab Oral DAILY    naloxone (NARCAN) injection 1 mg  1 mg IntraVENous DAILY PRN    lactated Ringers infusion  150 mL/hr IntraVENous CONTINUOUS         Other Studies:  Mri Abd Wo Cont    Result Date: 7/12/2020  MRI of the Abdomen INDICATION: Pancreatitis TECHNIQUE:  Routine axial and coronal MR sequences were obtained through the abdomen. MRCP sequences were included. FINDINGS: The common bile duct measures 4 mm diameter. There are 2 or 3 small filling defects in the common duct just proximal to the ampulla. Pancreatic duct is normal in caliber. Gallbladder is absent. There are no focal masses in the liver, spleen, or pancreas. The kidneys and adrenal glands are also unremarkable. There is no hydronephrosis. There are no inflammatory changes or fluid collections in the abdomen. IMPRESSION:  Multiple common duct stones. No significant bile duct dilatation. ** If there are any questions about this report, I can be reached on [employer] or at [phone] **     [address]    Result Date: 7/12/2020  EXAM: Limited abdomen ultrasound. INDICATION: Elevated liver enzymes. COMPARISON: None. TECHNIQUE: Standard protocol limited right upper quadrant abdomen ultrasound. FINDINGS: - Liver: Within normal limits. - Gallbladder: There has been a prior cholecystectomy. No fluid is seen in the gallbladder fossa. - Bile ducts: Within normal limits. The common bile duct measures 6.3 mm. - Pancreas: The pancreas was obscured by overlying bowel gas. - Right kidney: Within normal limits. - Aorta and IVC: Within normal limits. - Portal vein: Within normal limits. - Other: No ascites. IMPRESSION: Unremarkable study, in this postcholecystectomy patient. Assessment and Plan:     Active Hospital Problems    Diagnosis Date Noted    Hyperbilirubinemia 07/13/2020    Elevated liver enzymes 07/13/2020    Choledocholithiasis 07/13/2020    Pancreatitis 07/11/2020    Transaminitis 07/11/2020    S/P cholecystectomy 07/01/2020       Plan:  Acute Pancreatitis likely due to choledocholithiasis  Elevated LFTs and Hyperbilirubinemia   Recent calculus cholecystitis s/p lap choley (6/17). Liapse of 1500 on admission. MRCP as above. S/p ERCP with removal of 2 stones and sphincterotomy. - pain and nausea control  - regular diet    Tobacco Use: smoking cessation discussed. Continue with nicotine patch    Morbid Obesity: Body mass index is 34.8 kg/m². Diet:  DIET REGULAR  DVT PPx: heparin sq  Code: Full Code  Dispo: home  Estimated Discharge: 24hrs    Labs/Imaging Reviewed. Patient is moderate risk due to current condition and comorbid conditions as well as requiring frequent monitoring. Plan discussed with staff, patient/family and are in agreement. Time Spent: Greater than 45 minutes was spent in reviewing charts, physical exam, discussion with patient, and answered any questions.     Signed By: Shu Costa MD     July 14, 2020

## 2020-07-14 NOTE — ROUTINE PROCESS
[address] July 14, 2020       RE: Hortensia Levin      To Whom It May Concern,    This is to certify that Hortensia Levin was admitted to [facility] on 7/11/2020 until 7/14/2020. She may return to work on 7/20/2020 per Dr Dann Bosworth. Please feel free to contact my office if you have any questions or concerns. Thank you for your assistance in this matter.       Sincerely,  Raghav Zhang, [address]  [phone]

## 2020-07-14 NOTE — DISCHARGE INSTRUCTIONS
Endoscopic Retrograde Cholangiopancreatogram (ERCP): What to Expect at [facility]  After you have an endoscopic retrograde cholangiopancreatogram (ERCP), you probably will stay at the hospital or clinic for 1 to 2 hours. This will allow the medicine to wear off. You will be able to go home after your doctor or a nurse checks to make sure you are not having any problems. If you stay in the hospital overnight, you may go home the next day. You may have a sore throat for a day or two after the procedure. This care sheet gives you a general idea about how long it will take for you to recover. But each person recovers at a different pace. Follow the steps below to get better as quickly as possible. How can you care for yourself at home? Activity  · Rest as much as you need to after you go home. · You should be able to go back to your usual activities the day after the procedure. Diet  · Follow your doctor's directions for eating after the procedure. · Drink plenty of fluids (unless your doctor tells you not to). Medicines  · Your doctor will tell you if and when you can restart your medicines. He or she will also give you instructions about taking any new medicines. · If you take aspirin or some other blood thinner, ask your doctor if and when to start taking it again. Make sure that you understand exactly what your doctor wants you to do. · If you have a sore throat the next day, use an over-the-counter spray to numb your throat. Be safe with medicines. Read and follow all instructions on the label. Follow-up care is a key part of your treatment and safety. Be sure to make and go to all appointments, and call your doctor if you are having problems. It's also a good idea to know your test results and keep a list of the medicines you take. When should you call for help? [id number] anytime you think you may need emergency care. For example, call if:  · You passed out (lost consciousness).   · Your stools are maroon or very bloody. · You have trouble breathing. Call your doctor now or go to the emergency room if:  · You have new or worse belly pain. · You have pain that does not get better after you take pain medicine. · You have a fever. · You cannot pass stools or gas. · You are sick to your stomach or cannot hold down fluids. · You have blood in your stools. Watch closely for changes in your health, and be sure to contact your doctor if:  · Your throat still hurts after a day or two. · You do not get better as expected. Where can you learn more? Go to http://www.gray.com/  Enter [id number] in the search box to learn more about \"Endoscopic Retrograde Cholangiopancreatogram (ERCP): What to Expect at Home. \"  Current as of: August 12, 2019               Content Version: 12.5  © 2006-2020 [employer]. Care instructions adapted under license by [employer] (which disclaims liability or warranty for this information). If you have questions about a medical condition or this instruction, always ask your healthcare professional. [address] any warranty or liability for your use of this information. Pancreatitis: Care Instructions  Your Care Instructions     The pancreas is an organ behind the stomach. It makes hormones and enzymes to help your body digest food. But if these enzymes attack the pancreas, it can get inflamed. This is called pancreatitis. Most cases are caused by gallstones or by heavy alcohol use. If you take care of yourself at home, it will help you get better. It will also help you avoid more problems with your pancreas. Follow-up care is a key part of your treatment and safety. Be sure to make and go to all appointments, and call your doctor if you are having problems. It's also a good idea to know your test results and keep a list of the medicines you take. How can you care for yourself at home?   · Drink clear liquids and eat bland foods until you feel better. [place] foods include rice, dry toast, and crackers. They also include bananas and applesauce. · Eat a low-fat diet until your doctor says your pancreas is healed. · Do not drink alcohol. Tell your doctor if you need help to quit. Counseling, support groups, and sometimes medicines can help you stay sober. · Be safe with medicines. Read and follow all instructions on the label. ? If the doctor gave you a prescription medicine for pain, take it as prescribed. ? If you are not taking a prescription pain medicine, ask your doctor if you can take an over-the-counter medicine. · If your doctor prescribed antibiotics, take them as directed. Do not stop taking them just because you feel better. You need to take the full course of antibiotics. · Get extra rest until you feel better. To prevent future problems with your pancreas  · Do not drink alcohol. · Tell your doctors and pharmacist that you've had pancreatitis. They can help you avoid medicines that may cause this problem again. When should you call for help? [id number] anytime you think you may need emergency care. For example, call if:  · You vomit blood or what looks like coffee grounds. · Your stools are maroon or very bloody. Call your doctor now or seek immediate medical care if:  · You have new or worse belly pain. · Your stools are black and look like tar, or they have streaks of blood. · You are vomiting. Watch closely for changes in your health, and be sure to contact your doctor if:  · You do not get better as expected. Where can you learn more? Go to http://shira-kelvin.info/  Enter J381 in the search box to learn more about \"Pancreatitis: Care Instructions. \"  Current as of: August 12, 2019               Content Version: 12.5  © 5798-7025 [employer].    Care instructions adapted under license by [employer] (which disclaims liability or warranty for this information). If you have questions about a medical condition or this instruction, always ask your healthcare professional. [place] [address] any warranty or liability for your use of this information.

## 2020-07-14 NOTE — PROGRESS NOTES
Pt with discharge orders this day. Pt to return home. No CM needs voiced at discharge. Milestones met.      Care Management Interventions  Transition of Care Consult (CM Consult): Discharge Planning  Discharge Location  Discharge Placement: Home

## 2020-07-14 NOTE — PROGRESS NOTES
END OF SHIFT NOTE:    INTAKE/OUTPUT  07/13 0701 - 07/14 0700  In: 1289 [P.O.:530; I.V.:3143]  Out: 1300 [Urine:1300]  Voiding: YES  Catheter: NO  Drain:              Flatus: Patient does have flatus present. Stool:  0 occurrences. Characteristics:       Emesis: 0 occurrences. Characteristics:        VITAL SIGNS  Patient Vitals for the past 12 hrs:   Temp Pulse Resp BP SpO2   07/14/20 0325 98.1 °F (36.7 °C) 69 16 97/70 98 %   07/13/20 2316 98.1 °F (36.7 °C) 72 16 107/67 97 %   07/13/20 1928 98.1 °F (36.7 °C) 91 16 98/69 100 %       Pain Assessment  Pain Intensity 1: 0 (07/14/20 0215)  Pain Location 1: Back, Throat  Pain Intervention(s) 1: Medication (see MAR)  Patient Stated Pain Goal: 0    Ambulating  Yes    Shift report given to oncoming nurse at the bedside.     [address]

## 2020-07-14 NOTE — PROGRESS NOTES
[facility] Progress Note         Admit Date:  7/11/2020    Today's Date:  7/14/2020    CC:  Choledocholithiasis    Subjective:     Patient S/P ERCP 7/13. Liver chemistry improving today. Tolerating liquids - hungry. Passing flatus but no stool overnight. ERCP: Endoscopic Retrograde Cholangiopancreatogram  7/13/2020   INDICATION: Choledocholithiasis    POSTPROCEDURE DIAGNOSIS: Choledocholithiasis    FINDINGS:  Limited views of the esophagus and stomach appeared normal. The ampulla was normal in appearance but located in an odd position. Using a 39 sphincterotome preloaded with a 0.025\" wire, the CBD was selectively cannulated. Cholangiogram showed normal appearing ducts with a small distal filling defect. A moderate sphincterotomy was made using ERBE blended current. Next, multiple balloon sweeps were made with a 9-12mm balloon producing 2 moderate sized yellow stones. Occlusion cholangiogram showed no further filling defects.  The pancreatic duct was not accessed intentionally.    Estimated Blood Loss: 0 cc-min   ASSESSMENT/PLAN:  Choledocholithiasis   - Trend LFTs  - Start clears  - F/u with inpt rounding team   P Jabier Hashimoto, MD      Medications:   Current Facility-Administered Medications   Medication Dose Route Frequency    lidocaine (XYLOCAINE) 10 mg/mL (1 %) injection 0.1 mL  0.1 mL SubCUTAneous PRN    lactated Ringers infusion  100 mL/hr IntraVENous CONTINUOUS    midazolam (VERSED) injection 2 mg  2 mg IntraVENous ONCE PRN    lactated Ringers infusion  100 mL/hr IntraVENous CONTINUOUS    HYDROmorphone (PF) (DILAUDID) injection 0.5 mg  0.5 mg IntraVENous Multiple    naloxone (NARCAN) injection 0.04 mg  0.04 mg IntraVENous EVERY 2 MINUTES AS NEEDED    promethazine (PHENERGAN) with saline injection 6.25 mg  6.25 mg IntraVENous Q15MIN PRN    sodium chloride (NS) flush 5-40 mL  5-40 mL IntraVENous Q8H    sodium chloride (NS) flush 5-40 mL  5-40 mL IntraVENous PRN    HYDROmorphone (PF) (DILAUDID) injection 0.5 mg  0.5 mg IntraVENous Q4H PRN    ondansetron (ZOFRAN) injection 4 mg  4 mg IntraVENous Q4H PRN    heparin (porcine) injection 5,000 Units  5,000 Units SubCUTAneous Q8H    famotidine (PF) (PEPCID) 20 mg in 0.9% sodium chloride 10 mL injection  20 mg IntraVENous Q12H    diphenhydrAMINE (BENADRYL) capsule 25 mg  25 mg Oral Q6H PRN    oxyCODONE IR (ROXICODONE) tablet 5 mg  5 mg Oral Q4H PRN    senna-docusate (PERICOLACE) 8.6-50 mg per tablet 1 Tab  1 Tab Oral DAILY    naloxone (NARCAN) injection 1 mg  1 mg IntraVENous DAILY PRN    lactated Ringers infusion  150 mL/hr IntraVENous CONTINUOUS       Review of Systems:  ROS was obtained, with pertinent positives as listed above. No chest pain or SOB. Diet:  clears    Objective:   Vitals:  Visit Vitals  /72 (BP 1 Location: Left arm, BP Patient Position: At rest)   Pulse 61   Temp 98.6 °F (37 °C)   Resp 16   Ht 5' 3.25\" (1.607 m)   Wt 89.8 kg (198 lb)   SpO2 100%   BMI 34.80 kg/m²     Intake/Output:  07/14 0701 - 07/14 1900  In: -   Out: 600 [Urine:600]  07/12 1901 - 07/14 0700  In: 3066 [P.O.:530; I.V.:4684]  Out: 3300 [Urine:3300]  Exam:  General appearance: alert, cooperative, no distress  Lungs: clear to auscultation bilaterally anteriorly  Heart: regular rate and rhythm  Abdomen: soft, non-tender.  Bowel sounds normal. No masses, no organomegaly  Extremities: extremities normal, atraumatic, no cyanosis or edema  Neuro:  alert and oriented    Data Review (Labs):    Recent Labs     07/14/20  0529 07/13/20  0437 07/12/20  0525 07/11/20 2056   WBC 4.2* 5.1 4.9 8.7   HGB 10.5* 11.2* 11.8 13.1   HCT 30.7* 35.3* 34.3* 40.3    158 189 215   MCV 94.2 95.9 93.0 93.3    139 140 141   K 3.7 3.5 3.6 3.6   * 109* 109* 109*   CO2 23 23 25 24   BUN 6 7 9 13   CREA 0.48* 0.53* 0.62 0.79   CA 7.8* 8.1* 8.4 9.7   GLU 80 58* 85 99    151* 177* 192*   * 377* 569* 521*   TBILI 0.4 0.9 1.7* 1.7* CBIL 0.2 0.2  --   --    ALB 2.7* 2.8* 3.0* 3.9   TP 5.7* 6.1* 6.4 7.8   LPSE  --   --  265 >1,500*     MRI of the Abdomen 7/12/2020   INDICATION: Pancreatitis   FINDINGS:   The common bile duct measures 4 mm diameter. There are 2 or 3 small filling  defects in the common duct just proximal to the ampulla. Pancreatic duct is  normal in caliber. Gallbladder is absent.   There are no focal masses in the liver, spleen, or pancreas. The kidneys and  adrenal glands are also unremarkable. There is no hydronephrosis. There are no  inflammatory changes or fluid collections in the abdomen.   IMPRESSION  IMPRESSION:  Multiple common duct stones. No significant bile duct dilatation. Limited abdomen ultrasound 7/11/2020  FINDINGS:   - Liver: Within normal limits. - Gallbladder: There has been a prior cholecystectomy. No fluid is seen in the  gallbladder fossa. - Bile ducts: Within normal limits. The common bile duct measures 6.3 mm.  - Pancreas: The pancreas was obscured by overlying bowel gas. - Right kidney: Within normal limits. - Aorta and IVC: Within normal limits. - Portal vein: Within normal limits.  - Other: No ascites.   IMPRESSION  IMPRESSION: Unremarkable study, in this postcholecystectomy patient. Assessment:     Principal Problem:    Pancreatitis (7/11/2020)    Active Problems:    S/P cholecystectomy (7/1/2020)      Transaminitis (7/11/2020)      Hyperbilirubinemia (7/13/2020)      Elevated liver enzymes (7/13/2020)      Choledocholithiasis (7/13/2020)        Plan:     35 yo female S/P laparoscopic cholecystectomy on 6-17-20 by Dr Cassandra Arreola. On U/S then she had multiple gallstones and a thick GB wall. Her CBD was 4.1 mm and there was no intrahepatic dilation.  She also had a fatty liver. Pathology from surgery noted chronic cholecystitis and cholelithiasis.  She continued to have abd pain.   Her pain post surgery continued and she continue to have sx as above.  On 7-10-20 she underwent an EGD for sx and some dysphagia.  Dr Kristene Kehr at [facility] found a normal upper gi tract, did biopsies for JOHN which was neg and gastric random which are pending. He empirically dilated her with a 51 Fr Savary without complication. She denies ETOH use. ERCP with sphincterotomy 7/13 for CBD stones on MRCP. Feels improved today. 1.  Liver chemistry improving  2. Ok to advance diet  3. We will sign off; please call as needed      Patient is seen and examined in collaboration with Dr. Carlos Pereira. Assessment and plan as per Dr. Carlos Pereira. Yecenia Leon NP    ATTENDING NOTE:  I agree with the above assessment and plan.     Gm Carrion MD

## 2020-07-15 ENCOUNTER — PATIENT OUTREACH (OUTPATIENT)
Dept: CASE MANAGEMENT | Age: 35
End: 2020-07-15

## 2020-07-16 NOTE — PROGRESS NOTES
Patient was admitted to [facility] on 2020 and discharged on 2020 for Pancreatitis. Patient was contacted within 1 business days of discharge. Top Discharge Challenges to be reviewed by the provider   Additional needs identified to be addressed with provider no  none  Discussed COVID-19 related testing which was pending at this time. Test results were not done. Patient informed of results, if available? N/A   Method of communication with provider : none       Advance Care Planning:   Does patient have an Advance Directive:  reviewed and current     Inpatient Readmission Risk score: 50  Was this a readmission? yes   Patient stated reason for the admission: Recent surgery f/b pancreatitis  Patients top risk factors for readmission: Recent surgery  Interventions to address risk factors:   Educating on follow up with PCP/surgeon and calling providers as needed    Care Transition Nurse (CTN) contacted the patient by telephone to perform post hospital discharge assessment. Verified name and  with patient as identifiers. Provided introduction to self, and explanation of the CTN role. CTN reviewed discharge instructions, medical action plan and red flags with patient who verbalized understanding. Patient given an opportunity to ask questions and does not have any further questions or concerns at this time. The patient agrees to contact the PCP office for questions related to their healthcare. Medication reconciliation was performed with patient, who verbalizes understanding of administration of home medications. Advised obtaining a 90-day supply of all daily and as-needed medications. Referral to Pharm D needed: no     Home Health/Outpatient orders at discharge: none    Covid Risk Education    Patient has following risk factors of: no known risk factors.  Education provided regarding infection prevention, and signs and symptoms of COVID-19 and when to seek medical attention with patient who verbalized understanding. Discussed exposure protocols and quarantine From CDC: Are you at higher risk for severe illness?  and given an opportunity for questions and concerns. The patient agrees to contact the COVID-19 hotline [phone] or PCP office for questions related to COVID-19. For more information on steps you can take to protect yourself, see CDC's How to Protect Yourself     Patient/family/caregiver given information for [employer] [facility] and agrees to enroll no  Patient's preferred e-mail: declines  Patient's preferred phone number: declines    Discussed follow-up appointments. If no appointment was previously scheduled, appointment scheduling offered: [facility] follow up appointment(s): No future appointments. [facility] follow up appointment(s): PCP  Plan for follow-up call in 10-14 days based on severity of symptoms and risk factors. CTN provided contact information for future needs.     Goals Addressed    Follow up

## 2020-08-17 ENCOUNTER — PATIENT OUTREACH (OUTPATIENT)
Dept: CASE MANAGEMENT | Age: 35
End: 2020-08-17

## 2020-08-31 NOTE — PROGRESS NOTES
Late entry for 8/17/2020    Patient resolved from Transition of Care episode on 8/17/2020  Discussed COVID-19 related testing which was not done at this time. Test results were not done. Patient informed of results, if available? yes     Patient/family has been provided the following resources and education related to COVID-19:                         Signs, symptoms and red flags related to COVID-19            CDC exposure and quarantine guidelines            [facility] exposure contact - [phone]            Contact for their local Department of Health                 Patient currently reports that the following symptoms have improved:  None. No further outreach scheduled with this CTN. Episode of Care resolved. Patient has this CTN contact information if future needs arise.

## 2021-09-23 ENCOUNTER — HOSPITAL ENCOUNTER (EMERGENCY)
Age: 36
Discharge: HOME | End: 2021-09-23
Attending: STUDENT IN AN ORGANIZED HEALTH CARE EDUCATION/TRAINING PROGRAM
Payer: COMMERCIAL

## 2021-09-23 ENCOUNTER — APPOINTMENT (OUTPATIENT)
Dept: GENERAL RADIOLOGY | Age: 36
End: 2021-09-23
Attending: PHYSICIAN ASSISTANT
Payer: COMMERCIAL

## 2021-09-23 VITALS
HEART RATE: 71 BPM | SYSTOLIC BLOOD PRESSURE: 130 MMHG | WEIGHT: 189 LBS | BODY MASS INDEX: 33.49 KG/M2 | TEMPERATURE: 96.9 F | OXYGEN SATURATION: 99 % | HEIGHT: 63 IN | RESPIRATION RATE: 17 BRPM | DIASTOLIC BLOOD PRESSURE: 84 MMHG

## 2021-09-23 DIAGNOSIS — Z87.19 HISTORY OF GASTROESOPHAGEAL REFLUX (GERD): ICD-10-CM

## 2021-09-23 DIAGNOSIS — K20.90 ESOPHAGITIS: Primary | ICD-10-CM

## 2021-09-23 LAB
ATRIAL RATE: 79 BPM
CALCULATED P AXIS, ECG09: 55 DEGREES
CALCULATED R AXIS, ECG10: 39 DEGREES
CALCULATED T AXIS, ECG11: 42 DEGREES
DIAGNOSIS, 93000: NORMAL
P-R INTERVAL, ECG05: 166 MS
Q-T INTERVAL, ECG07: 380 MS
QRS DURATION, ECG06: 70 MS
QTC CALCULATION (BEZET), ECG08: 435 MS
VENTRICULAR RATE, ECG03: 79 BPM

## 2021-09-23 PROCEDURE — 71045 X-RAY EXAM CHEST 1 VIEW: CPT

## 2021-09-23 PROCEDURE — 74011000250 HC RX REV CODE- 250: Performed by: STUDENT IN AN ORGANIZED HEALTH CARE EDUCATION/TRAINING PROGRAM

## 2021-09-23 PROCEDURE — 93005 ELECTROCARDIOGRAM TRACING: CPT

## 2021-09-23 PROCEDURE — 74011250637 HC RX REV CODE- 250/637: Performed by: STUDENT IN AN ORGANIZED HEALTH CARE EDUCATION/TRAINING PROGRAM

## 2021-09-23 PROCEDURE — 99284 EMERGENCY DEPT VISIT MOD MDM: CPT

## 2021-09-23 PROCEDURE — 70360 X-RAY EXAM OF NECK: CPT

## 2021-09-23 RX ORDER — PANTOPRAZOLE SODIUM 40 MG/1
40 TABLET, DELAYED RELEASE ORAL 2 TIMES DAILY
Qty: 40 TABLET | Refills: 0 | Status: SHIPPED | OUTPATIENT
Start: 2021-09-23 | End: 2021-10-13

## 2021-09-23 RX ORDER — PANTOPRAZOLE SODIUM 40 MG/1
40 TABLET, DELAYED RELEASE ORAL
Status: COMPLETED | OUTPATIENT
Start: 2021-09-23 | End: 2021-09-23

## 2021-09-23 RX ORDER — SUCRALFATE 1 G/1
1 TABLET ORAL 4 TIMES DAILY
Qty: 60 TABLET | Refills: 2 | Status: SHIPPED | OUTPATIENT
Start: 2021-09-23 | End: 2021-10-08

## 2021-09-23 RX ORDER — ONDANSETRON 4 MG/1
4 TABLET, ORALLY DISINTEGRATING ORAL
Qty: 8 TABLET | Refills: 2 | Status: SHIPPED | OUTPATIENT
Start: 2021-09-23

## 2021-09-23 RX ADMIN — PANTOPRAZOLE SODIUM 40 MG: 40 TABLET, DELAYED RELEASE ORAL at 09:54

## 2021-09-23 RX ADMIN — ALUMINUM HYDROXIDE, MAGNESIUM HYDROXIDE, DIMETHICONE 40 ML: 200; 200; 20 LIQUID ORAL at 09:50

## 2021-09-23 NOTE — ED TRIAGE NOTES
Pt to ED with \"food stuck in my throat\" x 2 days. Pt states she was eating jojo. Pt advises this happened one time before and she had to have an EGD. Masked.

## 2021-09-23 NOTE — ED NOTES
I have reviewed discharge instructions with the patient. The patient verbalized understanding. Patient left ED via Discharge Method: ambulatory to Home with self    Opportunity for questions and clarification provided. Patient given 3 scripts. To continue your aftercare when you leave the hospital, you may receive an automated call from our care team to check in on how you are doing. This is a free service and part of our promise to provide the best care and service to meet your aftercare needs.  If you have questions, or wish to unsubscribe from this service please call [phone]. Thank you for Choosing our [facility] Emergency Department.

## 2021-09-23 NOTE — ED NOTES
HX of GED on omeprazole, has had hx of food bolus w/ subsequent endoscopy, feels as if she has food stuck in throat for 2 days. Patient evaluated initially in triage. Rapid Medical Evaluation was conducted and necessary orders have been placed. I have performed a medical screening exam.  Care will now be transferred to the attending physician in the emergency department.   JONATHAN Wick 9:16 AM
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
## 2021-09-23 NOTE — DISCHARGE INSTRUCTIONS
Take the medication prescribed as directed. You should receive contact from the gastroenterology office to arrange follow-up. Please contact the provider above if you do not receive this call in the next 48 hours. Return for worsening symptoms, concerns or questions. Follow a soft, bland diet for the next 48 hours.

## 2022-03-18 PROBLEM — K85.90 PANCREATITIS: Status: ACTIVE | Noted: 2020-07-11

## 2022-03-18 PROBLEM — Z90.49 S/P CHOLECYSTECTOMY: Status: ACTIVE | Noted: 2020-07-01

## 2022-03-19 PROBLEM — K80.50 CHOLEDOCHOLITHIASIS: Status: ACTIVE | Noted: 2020-07-13

## 2022-03-19 PROBLEM — E80.6 HYPERBILIRUBINEMIA: Status: ACTIVE | Noted: 2020-07-13

## 2022-03-20 PROBLEM — R74.01 TRANSAMINITIS: Status: ACTIVE | Noted: 2020-07-11

## 2022-03-20 PROBLEM — R74.8 ELEVATED LIVER ENZYMES: Status: ACTIVE | Noted: 2020-07-13

## 2023-08-23 ENCOUNTER — OFFICE VISIT (OUTPATIENT)
Dept: ENT CLINIC | Age: 38
End: 2023-08-23
Payer: COMMERCIAL

## 2023-08-23 VITALS — BODY MASS INDEX: 36.02 KG/M2 | HEIGHT: 64 IN | WEIGHT: 211 LBS

## 2023-08-23 DIAGNOSIS — J34.3 NASAL TURBINATE HYPERTROPHY: ICD-10-CM

## 2023-08-23 DIAGNOSIS — H69.81 DYSFUNCTION OF RIGHT EUSTACHIAN TUBE: ICD-10-CM

## 2023-08-23 DIAGNOSIS — J32.9 CHRONIC SINUSITIS, UNSPECIFIED LOCATION: Primary | ICD-10-CM

## 2023-08-23 DIAGNOSIS — J34.2 NASAL SEPTAL DEVIATION: ICD-10-CM

## 2023-08-23 DIAGNOSIS — H90.11 CONDUCTIVE HEARING LOSS OF RIGHT EAR, UNSPECIFIED HEARING STATUS ON CONTRALATERAL SIDE: ICD-10-CM

## 2023-08-23 DIAGNOSIS — H72.91 TYMPANIC MEMBRANE PERFORATION, RIGHT: ICD-10-CM

## 2023-08-23 PROCEDURE — 99244 OFF/OP CNSLTJ NEW/EST MOD 40: CPT | Performed by: OTOLARYNGOLOGY

## 2023-08-23 RX ORDER — CEPHALEXIN 500 MG/1
CAPSULE ORAL
COMMUNITY
Start: 2023-08-20

## 2023-08-23 RX ORDER — AMOXICILLIN AND CLAVULANATE POTASSIUM 875; 125 MG/1; MG/1
1 TABLET, FILM COATED ORAL 2 TIMES DAILY
Qty: 28 TABLET | Refills: 0 | Status: SHIPPED | OUTPATIENT
Start: 2023-08-23 | End: 2023-09-06

## 2023-08-23 RX ORDER — CALCIUM CARBONATE 750 MG/1
TABLET, CHEWABLE ORAL
COMMUNITY

## 2023-08-23 NOTE — PROGRESS NOTES
pertinent findings section below, findings were within normal limits. APPEARANCE:   General assessment for development status, nutritional status, and for pain or distress was performed. COMMUNICATION:   Ability to communicate effectively including vocal quality was assessed. HEAD AND FACE:   General exam of the face and scalp for any gross masses or lesions was performed. Palpation of the sinuses for any sign of pain or tenderness was performed. Facial nerve examination for any facial mimetic muscle asymmetry at rest and with effort was performed. Palpation of the submandibular and parotid glands was performed to assess for asymmetry, nodule or masses. EYES:   Extraocular motility was assessed for medial, lateral, superior and inferior rectus function as well as inferior and superior oblique function. The conjunctiva and eyelids were examined for injection, pallor or swelling. Pupil reactivity and accomodation was assessed. EARS:   External inspection and palpation of the auricular skin and cartilage was performed for lesion or abnormality. Otoscopy of the external auditory and tympanic membranes was performed to assess for patency, induration, erythema, tympanic membrane health and mobility and the presence of any middle ear fluid or abnormality. Speech reception thresholds were grossly assessed through communication at normal conversational levels. NOSE:   External exam for gross deformity of the nasal bones and upper and lower lateral cartilages was performed. Anterior rhinoscopy was performed to assess the patency of the nasal airway, the anatomy of the nasal septum and turbinates as well as the nasal valve region, and the general mucosal health. The presence of any rhinorrhea and its consistency was noted. Any abnormalities requiring further evaluation by nasal endoscopy will be described below.      MOUTH/PHARYNX/LARYNX:   Assessment of the lips, gums, hard/soft

## 2023-12-08 ENCOUNTER — OFFICE VISIT (OUTPATIENT)
Dept: AUDIOLOGY | Age: 38
End: 2023-12-08
Payer: COMMERCIAL

## 2023-12-08 ENCOUNTER — OFFICE VISIT (OUTPATIENT)
Dept: ENT CLINIC | Age: 38
End: 2023-12-08

## 2023-12-08 VITALS — HEART RATE: 94 BPM | HEIGHT: 64 IN | BODY MASS INDEX: 37.05 KG/M2 | OXYGEN SATURATION: 98 % | WEIGHT: 217 LBS

## 2023-12-08 DIAGNOSIS — H72.2X1 TYMPANIC MEMBRANE PERFORATION, MARGINAL, RIGHT: ICD-10-CM

## 2023-12-08 DIAGNOSIS — H90.11 CONDUCTIVE HEARING LOSS OF RIGHT EAR WITH UNRESTRICTED HEARING OF LEFT EAR: Primary | ICD-10-CM

## 2023-12-08 DIAGNOSIS — J34.3 NASAL TURBINATE HYPERTROPHY: ICD-10-CM

## 2023-12-08 DIAGNOSIS — J32.9 CHRONIC RHINOSINUSITIS: Primary | ICD-10-CM

## 2023-12-08 DIAGNOSIS — J34.2 NASAL SEPTAL DEVIATION: ICD-10-CM

## 2023-12-08 DIAGNOSIS — H69.91 DYSFUNCTION OF RIGHT EUSTACHIAN TUBE: ICD-10-CM

## 2023-12-08 DIAGNOSIS — H90.11 CONDUCTIVE HEARING LOSS OF RIGHT EAR WITH UNRESTRICTED HEARING OF LEFT EAR: ICD-10-CM

## 2023-12-08 PROCEDURE — 92557 COMPREHENSIVE HEARING TEST: CPT | Performed by: AUDIOLOGIST

## 2023-12-08 RX ORDER — BENZONATATE 200 MG/1
CAPSULE ORAL
COMMUNITY
Start: 2023-12-01

## 2023-12-08 RX ORDER — DOXYCYCLINE HYCLATE 20 MG
20 TABLET ORAL 2 TIMES DAILY
COMMUNITY

## 2023-12-08 NOTE — PROGRESS NOTES
AUDIOLOGY EVALUATION    Mehdi Velasquez had Audiometry performed today. The patient reports tinnitus and hearing loss in her right ear. Results as follows: Audiometry    Test Performed - Comprehensive Audiogram    Type of Loss - Right Ear: abnormal hearing: degree of loss is normal to moderate conductive hearing loss                           Left Ear: normal hearing    SRT   Measurement Right Ear Left Ear   Value 20 10   Unit dB dB     Discrimination  Measurement Right Ear Left Ear   Value 100% 100%   Unit dB dB     Recommend  Retest following otologic management    A.  [address] [facility], [address] [facility]  Audiologist

## 2023-12-08 NOTE — PROGRESS NOTES
Shan Atwood MD [address] [place] [address], [address] [place] [address]  P: [phone]          2023    Chief Complaint   Patient presents with    Follow-up     3 wk follow up Audio         HPI:  Edda Dunn is a 41year old female following for Audio. At last visit she was to get CT scan. She reports she has not. She reports nasal congestion,pain in ears,itchy ears, and tinnitus. She suffers from chronic postnasal drainage. This was not relieved by her 21-day course of antibiotics. She denies ear drainage at today's visit. She does note persistence of hearing loss. She did have an audio for review today. Current Outpatient Medications   Medication Sig Dispense Refill    benzonatate (TESSALON) 200 MG capsule TAKE 1 CAPSULE (ORAL) 3 TIMES PER DAY (COUGH) FOR 7 DAYS      doxycycline hyclate (PERIOSTAT) 20 MG tablet Take 1 tablet by mouth 2 times daily      calcium carbonate (TUMS EX) 750 MG chewable tablet by Oral/Gastric Tube route      cephALEXin (KEFLEX) 500 MG capsule TAKE 1 CAPSULE BY MOUTH THREE TIMES A DAY FOR 10 DAYS      albuterol sulfate  (90 Base) MCG/ACT inhaler Inhale 2 puffs into the lungs every 4 hours as needed      amLODIPine (NORVASC) 5 MG tablet Take 1 tablet by mouth daily      ondansetron (ZOFRAN-ODT) 4 MG disintegrating tablet Take 1 tablet by mouth every 8 hours as needed      ondansetron (ZOFRAN) 4 MG tablet Take 1 tablet by mouth every 6 hours as needed      sucralfate (CARAFATE) 1 GM/10ML suspension Take 20 mLs by mouth 4 times daily       No current facility-administered medications for this visit.         Past Medical History:   Diagnosis Date    Anal fissure     Aspiration pneumonia ([address]) 3/18/2012    Asthma     meds controlled    CAD (coronary artery disease) age 22    MI \"from stress\"    Chronic pain     rectal    Headache     Hypertension     Menorrhagia     Reflux 3/18/2012        Past Surgical History:   Procedure Laterality Date    GYN  age 13

## 2024-01-10 ENCOUNTER — OFFICE VISIT (OUTPATIENT)
Dept: ENT CLINIC | Age: 39
End: 2024-01-10
Payer: COMMERCIAL

## 2024-01-10 ENCOUNTER — PREP FOR PROCEDURE (OUTPATIENT)
Dept: ENT CLINIC | Age: 39
End: 2024-01-10

## 2024-01-10 VITALS — BODY MASS INDEX: 37.9 KG/M2 | HEIGHT: 64 IN | WEIGHT: 222 LBS

## 2024-01-10 DIAGNOSIS — J32.2 CHRONIC ETHMOIDAL SINUSITIS: ICD-10-CM

## 2024-01-10 DIAGNOSIS — J34.3 NASAL TURBINATE HYPERTROPHY: ICD-10-CM

## 2024-01-10 DIAGNOSIS — J34.3 HYPERTROPHY OF NASAL TURBINATES: ICD-10-CM

## 2024-01-10 DIAGNOSIS — J34.2 NASAL SEPTAL DEVIATION: Primary | ICD-10-CM

## 2024-01-10 DIAGNOSIS — H72.91 TYMPANIC MEMBRANE PERFORATION, RIGHT: ICD-10-CM

## 2024-01-10 DIAGNOSIS — H90.11 CONDUCTIVE HEARING LOSS OF RIGHT EAR, UNSPECIFIED HEARING STATUS ON CONTRALATERAL SIDE: ICD-10-CM

## 2024-01-10 DIAGNOSIS — J32.9 CHRONIC SINUSITIS, UNSPECIFIED LOCATION: ICD-10-CM

## 2024-01-10 DIAGNOSIS — J32.0 CHRONIC MAXILLARY SINUSITIS: ICD-10-CM

## 2024-01-10 DIAGNOSIS — J34.2 DEVIATED NASAL SEPTUM: ICD-10-CM

## 2024-01-10 PROCEDURE — 99214 OFFICE O/P EST MOD 30 MIN: CPT | Performed by: OTOLARYNGOLOGY

## 2024-02-12 ENCOUNTER — ANESTHESIA EVENT (OUTPATIENT)
Dept: SURGERY | Age: 39
End: 2024-02-12
Payer: COMMERCIAL

## 2024-02-12 NOTE — PERIOP NOTE
Preop department called to notify patient of arrival time for scheduled procedure. Instructions given to   - Arrive at [facility] Entr[facility] [address].  - Remain NPO after midnight, unless otherwise indicated, including gum, mints, and ice chips.   - Have a responsible adult to drive patient to the hospital, stay during surgery, and patient will need supervision 24 hours after anesthesia.   - Use antibacterial soap in shower the night before surgery and on the morning of surgery.       Was patient contacted: yes-pt  Voicemail left: n/a  Numbers contacted: [phone]   Arrival time: 0800

## 2024-02-12 NOTE — PERIOP NOTE
Patient verified name and .  Order for consent  found in EHR and matches case posting; patient verifies procedure.   Type 1B surgery, PAT phone assessment complete.  Orders  received.  Labs per surgeon: none  Labs per anesthesia protocol: none indicated    Patient answered medical/surgical history questions at their best of ability. All prior to admission medications documented in EPIC.  Patient instructed to take the following medications the day of surgery according to anesthesia guidelines with a small sip of water: amlodipine, Zofran (if needed), albuterol inhaler (use and bring).   Hold all vitamins 7 days prior to surgery and NSAIDS 5 days prior to surgery. Prescription meds to hold:none  Patient instructed on the following:    > Arrive at [facility] Entr[facility], time of arrival to be called the day before by 1700  > NPO after midnight, unless otherwise indicated, including gum, mints, and ice chips  > Responsible adult must drive patient to the hospital, stay during surgery, and patient will need supervision 24 hours after anesthesia  > Use non moisturizing soap in shower the night before surgery and on the morning of surgery  > All piercings must be removed prior to arrival.    > Leave all valuables (money and jewelry) at home but bring insurance card and ID on DOS.   > You may be required to pay a deductible or co-pay on the day of your procedure. You can pre-pay by calling [phone] if your surgery is at the [facility] or [phone] if your surgery is at the [facility].  > Do not wear make-up, nail polish, lotions, cologne, perfumes, powders, or oil on skin. Artificial nails are not permitted.

## 2024-02-13 ENCOUNTER — ANESTHESIA (OUTPATIENT)
Dept: SURGERY | Age: 39
End: 2024-02-13
Payer: COMMERCIAL

## 2024-02-13 ENCOUNTER — HOSPITAL ENCOUNTER (OUTPATIENT)
Age: 39
Setting detail: OUTPATIENT SURGERY
Discharge: HOME | End: 2024-02-13
Attending: OTOLARYNGOLOGY | Admitting: OTOLARYNGOLOGY
Payer: COMMERCIAL

## 2024-02-13 VITALS
RESPIRATION RATE: 16 BRPM | SYSTOLIC BLOOD PRESSURE: 120 MMHG | DIASTOLIC BLOOD PRESSURE: 76 MMHG | TEMPERATURE: 98 F | HEIGHT: 63 IN | WEIGHT: 225 LBS | HEART RATE: 71 BPM | OXYGEN SATURATION: 100 % | BODY MASS INDEX: 39.87 KG/M2

## 2024-02-13 DIAGNOSIS — J34.2 NASAL SEPTAL DEVIATION: ICD-10-CM

## 2024-02-13 DIAGNOSIS — H90.11 CONDUCTIVE HEARING LOSS OF RIGHT EAR, UNSPECIFIED HEARING STATUS ON CONTRALATERAL SIDE: ICD-10-CM

## 2024-02-13 DIAGNOSIS — J34.3 NASAL TURBINATE HYPERTROPHY: ICD-10-CM

## 2024-02-13 DIAGNOSIS — J34.3 HYPERTROPHY OF NASAL TURBINATES: ICD-10-CM

## 2024-02-13 DIAGNOSIS — J32.2 CHRONIC ETHMOIDAL SINUSITIS: ICD-10-CM

## 2024-02-13 DIAGNOSIS — J32.9 CHRONIC SINUSITIS, UNSPECIFIED LOCATION: ICD-10-CM

## 2024-02-13 DIAGNOSIS — J32.0 CHRONIC MAXILLARY SINUSITIS: ICD-10-CM

## 2024-02-13 DIAGNOSIS — G89.18 POST-OP PAIN: Primary | ICD-10-CM

## 2024-02-13 DIAGNOSIS — J34.2 DEVIATED NASAL SEPTUM: ICD-10-CM

## 2024-02-13 PROCEDURE — 2709999900 HC NON-CHARGEABLE SUPPLY: Performed by: OTOLARYNGOLOGY

## 2024-02-13 PROCEDURE — 2580000003 HC RX 258: Performed by: ANESTHESIOLOGY

## 2024-02-13 PROCEDURE — 6370000000 HC RX 637 (ALT 250 FOR IP): Performed by: ANESTHESIOLOGY

## 2024-02-13 PROCEDURE — 6370000000 HC RX 637 (ALT 250 FOR IP): Performed by: OTOLARYNGOLOGY

## 2024-02-13 PROCEDURE — 31256 EXPLORATION MAXILLARY SINUS: CPT | Performed by: OTOLARYNGOLOGY

## 2024-02-13 PROCEDURE — 30520 REPAIR OF NASAL SEPTUM: CPT | Performed by: OTOLARYNGOLOGY

## 2024-02-13 PROCEDURE — 7100000010 HC PHASE II RECOVERY - FIRST 15 MIN: Performed by: OTOLARYNGOLOGY

## 2024-02-13 PROCEDURE — 2720000010 HC SURG SUPPLY STERILE: Performed by: OTOLARYNGOLOGY

## 2024-02-13 PROCEDURE — 3700000001 HC ADD 15 MINUTES (ANESTHESIA): Performed by: OTOLARYNGOLOGY

## 2024-02-13 PROCEDURE — 31255 NSL/SINS NDSC W/TOT ETHMDCT: CPT | Performed by: OTOLARYNGOLOGY

## 2024-02-13 PROCEDURE — 2500000003 HC RX 250 WO HCPCS: Performed by: OTOLARYNGOLOGY

## 2024-02-13 PROCEDURE — 3600000004 HC SURGERY LEVEL 4 BASE: Performed by: OTOLARYNGOLOGY

## 2024-02-13 PROCEDURE — 61782 SCAN PROC CRANIAL EXTRA: CPT | Performed by: OTOLARYNGOLOGY

## 2024-02-13 PROCEDURE — 7100000001 HC PACU RECOVERY - ADDTL 15 MIN: Performed by: OTOLARYNGOLOGY

## 2024-02-13 PROCEDURE — 6360000002 HC RX W HCPCS: Performed by: NURSE ANESTHETIST, CERTIFIED REGISTERED

## 2024-02-13 PROCEDURE — 31267 ENDOSCOPY MAXILLARY SINUS: CPT | Performed by: OTOLARYNGOLOGY

## 2024-02-13 PROCEDURE — 94640 AIRWAY INHALATION TREATMENT: CPT

## 2024-02-13 PROCEDURE — 30140 RESECT INFERIOR TURBINATE: CPT | Performed by: OTOLARYNGOLOGY

## 2024-02-13 PROCEDURE — 2500000003 HC RX 250 WO HCPCS: Performed by: NURSE ANESTHETIST, CERTIFIED REGISTERED

## 2024-02-13 PROCEDURE — 3700000000 HC ANESTHESIA ATTENDED CARE: Performed by: OTOLARYNGOLOGY

## 2024-02-13 PROCEDURE — 88304 TISSUE EXAM BY PATHOLOGIST: CPT

## 2024-02-13 PROCEDURE — 7100000000 HC PACU RECOVERY - FIRST 15 MIN: Performed by: OTOLARYNGOLOGY

## 2024-02-13 PROCEDURE — 3600000014 HC SURGERY LEVEL 4 ADDTL 15MIN: Performed by: OTOLARYNGOLOGY

## 2024-02-13 RX ORDER — ONDANSETRON 8 MG/1
8 TABLET, ORALLY DISINTEGRATING ORAL EVERY 8 HOURS PRN
Qty: 15 TABLET | Refills: 0 | Status: SHIPPED | OUTPATIENT
Start: 2024-02-13 | End: 2024-02-18

## 2024-02-13 RX ORDER — CEFAZOLIN SODIUM 1 G/3ML
INJECTION, POWDER, FOR SOLUTION INTRAMUSCULAR; INTRAVENOUS PRN
Status: DISCONTINUED | OUTPATIENT
Start: 2024-02-13 | End: 2024-02-13 | Stop reason: SDUPTHER

## 2024-02-13 RX ORDER — HYDROMORPHONE HYDROCHLORIDE 2 MG/ML
0.5 INJECTION, SOLUTION INTRAMUSCULAR; INTRAVENOUS; SUBCUTANEOUS EVERY 10 MIN PRN
Status: DISCONTINUED | OUTPATIENT
Start: 2024-02-13 | End: 2024-02-13 | Stop reason: HOSPADM

## 2024-02-13 RX ORDER — HYDROCODONE BITARTRATE AND ACETAMINOPHEN 5; 325 MG/1; MG/1
1 TABLET ORAL EVERY 4 HOURS PRN
Qty: 18 TABLET | Refills: 0 | Status: SHIPPED | OUTPATIENT
Start: 2024-02-13 | End: 2024-02-16

## 2024-02-13 RX ORDER — DEXMEDETOMIDINE HYDROCHLORIDE 100 UG/ML
INJECTION, SOLUTION INTRAVENOUS PRN
Status: DISCONTINUED | OUTPATIENT
Start: 2024-02-13 | End: 2024-02-13 | Stop reason: SDUPTHER

## 2024-02-13 RX ORDER — ROCURONIUM BROMIDE 10 MG/ML
INJECTION, SOLUTION INTRAVENOUS PRN
Status: DISCONTINUED | OUTPATIENT
Start: 2024-02-13 | End: 2024-02-13 | Stop reason: SDUPTHER

## 2024-02-13 RX ORDER — SODIUM CHLORIDE 0.9 % (FLUSH) 0.9 %
5-40 SYRINGE (ML) INJECTION EVERY 12 HOURS SCHEDULED
Status: DISCONTINUED | OUTPATIENT
Start: 2024-02-13 | End: 2024-02-13 | Stop reason: HOSPADM

## 2024-02-13 RX ORDER — DEXAMETHASONE SODIUM PHOSPHATE 10 MG/ML
INJECTION INTRAMUSCULAR; INTRAVENOUS PRN
Status: DISCONTINUED | OUTPATIENT
Start: 2024-02-13 | End: 2024-02-13 | Stop reason: SDUPTHER

## 2024-02-13 RX ORDER — ONDANSETRON 2 MG/ML
4 INJECTION INTRAMUSCULAR; INTRAVENOUS
Status: DISCONTINUED | OUTPATIENT
Start: 2024-02-13 | End: 2024-02-13 | Stop reason: HOSPADM

## 2024-02-13 RX ORDER — IPRATROPIUM BROMIDE AND ALBUTEROL SULFATE 2.5; .5 MG/3ML; MG/3ML
1 SOLUTION RESPIRATORY (INHALATION) ONCE
Status: COMPLETED | OUTPATIENT
Start: 2024-02-13 | End: 2024-02-13

## 2024-02-13 RX ORDER — LIDOCAINE HYDROCHLORIDE AND EPINEPHRINE 10; 10 MG/ML; UG/ML
INJECTION, SOLUTION INFILTRATION; PERINEURAL PRN
Status: DISCONTINUED | OUTPATIENT
Start: 2024-02-13 | End: 2024-02-13 | Stop reason: ALTCHOICE

## 2024-02-13 RX ORDER — MIDAZOLAM HYDROCHLORIDE 2 MG/2ML
2 INJECTION, SOLUTION INTRAMUSCULAR; INTRAVENOUS
Status: DISCONTINUED | OUTPATIENT
Start: 2024-02-13 | End: 2024-02-13 | Stop reason: HOSPADM

## 2024-02-13 RX ORDER — OXYCODONE HYDROCHLORIDE 5 MG/1
5 TABLET ORAL
Status: DISCONTINUED | OUTPATIENT
Start: 2024-02-13 | End: 2024-02-13 | Stop reason: HOSPADM

## 2024-02-13 RX ORDER — DIPHENHYDRAMINE HYDROCHLORIDE 50 MG/ML
12.5 INJECTION INTRAMUSCULAR; INTRAVENOUS
Status: DISCONTINUED | OUTPATIENT
Start: 2024-02-13 | End: 2024-02-13 | Stop reason: HOSPADM

## 2024-02-13 RX ORDER — EPHEDRINE SULFATE/0.9% NACL/PF 50 MG/5 ML
SYRINGE (ML) INTRAVENOUS PRN
Status: DISCONTINUED | OUTPATIENT
Start: 2024-02-13 | End: 2024-02-13 | Stop reason: SDUPTHER

## 2024-02-13 RX ORDER — OXYMETAZOLINE HYDROCHLORIDE 0.05 G/100ML
SPRAY NASAL PRN
Status: DISCONTINUED | OUTPATIENT
Start: 2024-02-13 | End: 2024-02-13 | Stop reason: ALTCHOICE

## 2024-02-13 RX ORDER — NEOMYCIN SULFATE, POLYMYXIN B SULFATE AND HYDROCORTISONE 10; 3.5; 1 MG/ML; MG/ML; [USP'U]/ML
SUSPENSION/ DROPS AURICULAR (OTIC) PRN
Status: DISCONTINUED | OUTPATIENT
Start: 2024-02-13 | End: 2024-02-13 | Stop reason: ALTCHOICE

## 2024-02-13 RX ORDER — FENTANYL CITRATE 50 UG/ML
100 INJECTION, SOLUTION INTRAMUSCULAR; INTRAVENOUS
Status: DISCONTINUED | OUTPATIENT
Start: 2024-02-13 | End: 2024-02-13 | Stop reason: HOSPADM

## 2024-02-13 RX ORDER — LIDOCAINE HYDROCHLORIDE 20 MG/ML
INJECTION, SOLUTION EPIDURAL; INFILTRATION; INTRACAUDAL; PERINEURAL PRN
Status: DISCONTINUED | OUTPATIENT
Start: 2024-02-13 | End: 2024-02-13 | Stop reason: SDUPTHER

## 2024-02-13 RX ORDER — MIDAZOLAM HYDROCHLORIDE 1 MG/ML
INJECTION INTRAMUSCULAR; INTRAVENOUS PRN
Status: DISCONTINUED | OUTPATIENT
Start: 2024-02-13 | End: 2024-02-13 | Stop reason: SDUPTHER

## 2024-02-13 RX ORDER — FENTANYL CITRATE 50 UG/ML
25 INJECTION, SOLUTION INTRAMUSCULAR; INTRAVENOUS EVERY 5 MIN PRN
Status: DISCONTINUED | OUTPATIENT
Start: 2024-02-13 | End: 2024-02-13 | Stop reason: HOSPADM

## 2024-02-13 RX ORDER — FENTANYL CITRATE 50 UG/ML
INJECTION, SOLUTION INTRAMUSCULAR; INTRAVENOUS PRN
Status: DISCONTINUED | OUTPATIENT
Start: 2024-02-13 | End: 2024-02-13 | Stop reason: SDUPTHER

## 2024-02-13 RX ORDER — SODIUM CHLORIDE, SODIUM LACTATE, POTASSIUM CHLORIDE, CALCIUM CHLORIDE 600; 310; 30; 20 MG/100ML; MG/100ML; MG/100ML; MG/100ML
INJECTION, SOLUTION INTRAVENOUS CONTINUOUS
Status: DISCONTINUED | OUTPATIENT
Start: 2024-02-13 | End: 2024-02-13 | Stop reason: HOSPADM

## 2024-02-13 RX ORDER — ONDANSETRON 2 MG/ML
INJECTION INTRAMUSCULAR; INTRAVENOUS PRN
Status: DISCONTINUED | OUTPATIENT
Start: 2024-02-13 | End: 2024-02-13 | Stop reason: SDUPTHER

## 2024-02-13 RX ORDER — PROPOFOL 10 MG/ML
INJECTION, EMULSION INTRAVENOUS PRN
Status: DISCONTINUED | OUTPATIENT
Start: 2024-02-13 | End: 2024-02-13 | Stop reason: SDUPTHER

## 2024-02-13 RX ORDER — CEFDINIR 300 MG/1
300 CAPSULE ORAL 2 TIMES DAILY
Qty: 20 CAPSULE | Refills: 0 | Status: SHIPPED | OUTPATIENT
Start: 2024-02-13 | End: 2024-02-23

## 2024-02-13 RX ORDER — SCOLOPAMINE TRANSDERMAL SYSTEM 1 MG/1
1 PATCH, EXTENDED RELEASE TRANSDERMAL
Status: DISCONTINUED | OUTPATIENT
Start: 2024-02-13 | End: 2024-02-13 | Stop reason: HOSPADM

## 2024-02-13 RX ORDER — NEOSTIGMINE METHYLSULFATE 1 MG/ML
INJECTION, SOLUTION INTRAVENOUS PRN
Status: DISCONTINUED | OUTPATIENT
Start: 2024-02-13 | End: 2024-02-13 | Stop reason: SDUPTHER

## 2024-02-13 RX ORDER — METOCLOPRAMIDE HYDROCHLORIDE 5 MG/ML
10 INJECTION INTRAMUSCULAR; INTRAVENOUS
Status: DISCONTINUED | OUTPATIENT
Start: 2024-02-13 | End: 2024-02-13 | Stop reason: HOSPADM

## 2024-02-13 RX ORDER — GLYCOPYRROLATE 0.2 MG/ML
INJECTION INTRAMUSCULAR; INTRAVENOUS PRN
Status: DISCONTINUED | OUTPATIENT
Start: 2024-02-13 | End: 2024-02-13 | Stop reason: SDUPTHER

## 2024-02-13 RX ADMIN — Medication 5 MG: at 10:47

## 2024-02-13 RX ADMIN — Medication 5 MG: at 11:24

## 2024-02-13 RX ADMIN — DEXMEDETOMIDINE 12 MCG: 100 INJECTION, SOLUTION, CONCENTRATE INTRAVENOUS at 10:37

## 2024-02-13 RX ADMIN — PROPOFOL 30 MG: 10 INJECTION, EMULSION INTRAVENOUS at 11:11

## 2024-02-13 RX ADMIN — SODIUM CHLORIDE, POTASSIUM CHLORIDE, SODIUM LACTATE AND CALCIUM CHLORIDE: 600; 310; 30; 20 INJECTION, SOLUTION INTRAVENOUS at 08:33

## 2024-02-13 RX ADMIN — FENTANYL CITRATE 100 MCG: 50 INJECTION, SOLUTION INTRAMUSCULAR; INTRAVENOUS at 10:24

## 2024-02-13 RX ADMIN — LIDOCAINE HYDROCHLORIDE 100 MG: 20 INJECTION, SOLUTION EPIDURAL; INFILTRATION; INTRACAUDAL; PERINEURAL at 10:24

## 2024-02-13 RX ADMIN — ONDANSETRON 4 MG: 2 INJECTION INTRAMUSCULAR; INTRAVENOUS at 10:31

## 2024-02-13 RX ADMIN — GLYCOPYRROLATE 0.8 MG: 0.2 INJECTION INTRAMUSCULAR; INTRAVENOUS at 11:24

## 2024-02-13 RX ADMIN — DEXMEDETOMIDINE 12 MCG: 100 INJECTION, SOLUTION, CONCENTRATE INTRAVENOUS at 11:11

## 2024-02-13 RX ADMIN — DEXAMETHASONE SODIUM PHOSPHATE 10 MG: 10 INJECTION INTRAMUSCULAR; INTRAVENOUS at 10:31

## 2024-02-13 RX ADMIN — ROCURONIUM BROMIDE 50 MG: 50 INJECTION, SOLUTION INTRAVENOUS at 10:24

## 2024-02-13 RX ADMIN — SUGAMMADEX 200 MG: 100 INJECTION, SOLUTION INTRAVENOUS at 11:32

## 2024-02-13 RX ADMIN — PROPOFOL 200 MG: 10 INJECTION, EMULSION INTRAVENOUS at 10:24

## 2024-02-13 RX ADMIN — MIDAZOLAM 2 MG: 1 INJECTION INTRAMUSCULAR; INTRAVENOUS at 10:15

## 2024-02-13 RX ADMIN — IPRATROPIUM BROMIDE AND ALBUTEROL SULFATE 1 DOSE: .5; 3 SOLUTION RESPIRATORY (INHALATION) at 12:41

## 2024-02-13 RX ADMIN — CEFAZOLIN 2 G: 1 INJECTION, POWDER, FOR SOLUTION INTRAMUSCULAR; INTRAVENOUS at 10:29

## 2024-02-13 NOTE — DISCHARGE INSTRUCTIONS
it for all naps and sleeping.  Please use caution when taking narcotics and any of your home medications that may cause drowsiness.  *  Please give a list of your current medications to your Primary Care Provider.  *  Please update this list whenever your medications are discontinued, doses are      changed, or new medications (including over-the-counter products) are added.  *  Please carry medication information at all times in case of emergency situations.    These are general instructions for a healthy lifestyle:  No smoking/ No tobacco products/ Avoid exposure to second hand smoke  Surgeon General's Warning:  Quitting smoking now greatly reduces serious risk to your health.  Obesity, smoking, and sedentary lifestyle greatly increases your risk for illness  A healthy diet, regular physical exercise & weight monitoring are important for maintaining a healthy lifestyle    You may be retaining fluid if you have a history of heart failure or if you experience any of the following symptoms:  Weight gain of 3 pounds or more overnight or 5 pounds in a week, increased swelling in our hands or feet or shortness of breath while lying flat in bed.  Please call your doctor as soon as you notice any of these symptoms; do not wait until your next office visit.

## 2024-02-13 NOTE — OP NOTE
Operative Note      Patient: Mansi Velasquez  YOB: 1985  MRN: [id number]    Date of Procedure: 2/13/2024    Pre-Op Diagnosis Codes:     * Chronic maxillary sinusitis [J32.0]     * Chronic ethmoidal sinusitis [J32.2]     * Deviated nasal septum [J34.2]     * Hypertrophy of nasal turbinates [J34.3]    Post-Op Diagnosis: Same       Procedure(s):  SINUS ENDOSCOPY FUNCTIONAL SURGERY IMAGE GUIDED Functional Endoscopic Sinus Surgery,  Right Maxillary Antrostomy,  Left Maxillary Antrostomy with Mucosa Removal, Total Ethmoidectomy  INFERIOR TURBINATE REDUCTION  SEPTOPLASTY GRAFT    Surgeon(s):  Reymundo De León MD    Assistant:  None    Anesthesia Type:  Gen.    EBL:  20 mL    Specimen:  Bilateral sinonasal contents    Splints/Implants:  Contreras's  Sinufoam    Findings  Complex septal deviation and spur projecting to the right middle meatus.  Bony turbinate hypertrophy.   Chronic edematous changes of the bilateral ethmoid sinuses.   Obstructed OMC's with mucoid exudates and severely edematous mucosa within the left maxillary sinus.    Description of Procedure:   Following discussion of the risks, benefits, indications, and alternatives  of the above-mentioned procedure, the patient was taken back to the  operating room and placed supine on the operating room table.  General endotracheal  anesthesia was induced by the Anesthesia Service and consent  was confirmed and time-out was performed.      The head of the patient was then prepped and draped in the usual fashion with the eyes protected. The Fusion headset was put into position and the device registered. Its accuracy was confirmed on known bony landmarks. Next, the nasal septum and bilateral inferior turbinate were infiltrated with 1% lidocaine with 1 100,000 epinephrine. The nasal mucosa was decongested with Afrin-soaked pledgets bilaterally. A left caudal hemitransfixion incision was executed on the nasal septum and a mucoperichondrial and

## 2024-02-13 NOTE — ANESTHESIA POSTPROCEDURE EVALUATION
Department of Anesthesiology  Postprocedure Note    Patient: Mansi Velasquez  MRN: [id number]  YOB: 1985  Date of evaluation: 2/13/2024    Procedure Summary     Date: 02/13/24 Room / Location: [facility] OP OR 04 / [facility]    Anesthesia Start: 1012 Anesthesia Stop: 1144    Procedures:       SINUS ENDOSCOPY FUNCTIONAL SURGERY IMAGE GUIDED Functional Endoscopic Sinus Surgery,  Right Maxillary Antrostomy,  Left Maxillary Antrostomy with Mucosa Removal, Total Ethmoidectomy (Nose)      INFERIOR TURBINATE REDUCTION (Bilateral: Nose)      SEPTOPLASTY GRAFT (Bilateral: Nose) Diagnosis:       Chronic maxillary sinusitis      Chronic ethmoidal sinusitis      Deviated nasal septum      Hypertrophy of nasal turbinates      (Chronic maxillary sinusitis [J32.0])      (Chronic ethmoidal sinusitis [J32.2])      (Deviated nasal septum [J34.2])      (Hypertrophy of nasal turbinates [J34.3])    Surgeons: Reymundo De León MD Responsible Provider: Reynaldo Donato DO    Anesthesia Type: general ASA Status: 3          Anesthesia Type: No value filed.    Joyce Phase I: Joyce Score: 8    Joyce Phase II: Joyce Score: 10    Anesthesia Post Evaluation    Patient location during evaluation: PACU  Level of consciousness: awake and alert  Airway patency: patent  Nausea & Vomiting: no nausea  Cardiovascular status: hemodynamically stable  Respiratory status: acceptable  Hydration status: euvolemic  Comments: Pt states breathing is better following duoneb.  Also states throat feels better after neb and something to drink.  Pt happy with progress and ready to go home.  Stable for DC.    Pain management: satisfactory to patient        No notable events documented.

## 2024-02-13 NOTE — PERIOP NOTE
Pt c/o \"tight throat\" and reports she is unable to swallow. Educated pt on normal swelling and soreness after ETT. Pt reports \"I've never felt like this before.\" VSS. Pt on room air. Kinga MCBRIDE called.

## 2024-02-13 NOTE — ANESTHESIA PRE PROCEDURE
Weight: 102.1 kg (225 lb) 102.1 kg (225 lb)   Height: 1.607 m (5' 3.25\")                                               BP Readings from Last 3 Encounters:   02/13/24 (!) 142/81       NPO Status: Time of last liquid consumption: 2100                        Time of last solid consumption: 2100                        Date of last liquid consumption: 02/12/24                        Date of last solid food consumption: 02/12/24    BMI:   Wt Readings from Last 3 Encounters:   02/13/24 102.1 kg (225 lb)   01/10/24 100.7 kg (222 lb)   12/08/23 98.4 kg (217 lb)     Body mass index is 39.54 kg/m².    CBC:   Lab Results   Component Value Date/Time    WBC 4.2 07/14/2020 05:29 AM    RBC 3.26 07/14/2020 05:29 AM    HGB 10.5 07/14/2020 05:29 AM    HCT 30.7 07/14/2020 05:29 AM    MCV 94.2 07/14/2020 05:29 AM    RDW 12.5 07/14/2020 05:29 AM     07/14/2020 05:29 AM       CMP:   Lab Results   Component Value Date/Time     07/14/2020 05:29 AM    K 3.7 07/14/2020 05:29 AM     07/14/2020 05:29 AM    CO2 23 07/14/2020 05:29 AM    BUN 6 07/14/2020 05:29 AM    CREATININE 0.48 07/14/2020 05:29 AM    GFRAA >60 07/14/2020 05:29 AM    AGRATIO 0.9 07/14/2020 05:29 AM    GLUCOSE 80 07/14/2020 05:29 AM    PROT 5.7 07/14/2020 05:29 AM    CALCIUM 7.8 07/14/2020 05:29 AM    BILITOT 0.4 07/14/2020 05:29 AM    ALKPHOS 125 07/14/2020 05:29 AM    AST 69 07/14/2020 05:29 AM     07/14/2020 05:29 AM       POC Tests: No results for input(s): \"POCGLU\", \"POCNA\", \"POCK\", \"POCCL\", \"POCBUN\", \"POCHEMO\", \"POCHCT\" in the last 72 hours.    Coags: No results found for: \"PROTIME\", \"INR\", \"APTT\"    HCG (If Applicable): No results found for: \"PREGTESTUR\", \"PREGSERUM\", \"HCG\", \"HCGQUANT\"     ABGs: No results found for: \"PHART\", \"PO2ART\", \"WCS1AXM\", \"HYC0NJX\", \"BEART\", \"Q2MNOBEE\"     Type & Screen (If Applicable):  No results found for: \"LABABO\", \"LABRH\"    Drug/Infectious Status (If Applicable):  Lab Results   Component Value Date/Time    HEPCAB
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
## 2024-02-16 ENCOUNTER — TELEPHONE (OUTPATIENT)
Dept: ENT CLINIC | Age: 39
End: 2024-02-16

## 2024-02-16 NOTE — TELEPHONE ENCOUNTER
Pts s/p Sinus surgery on 2/13/24. She called and talked to Kelly Garibay MA who relayed to me that patient was having severe congestion and could not breath. I advised Kelly to tell the patient that it is normal for congestion after surgery as she has stents placed in her nose and to make sure patient was cleaning around the stents to help with the airway.When Kelly spoke back with patient, she got upset and told her that might be true for normal people but she had asthma and was different.  Kelly came back to tell me patient was upset and I advised her I would talk to patient. I introduced myself and asked patient to tell me what symptoms she was having.  She proceeded to yell at me and tell me she was not going to \"F_ _ _ _ _ _\" telling me what was going on again. I explained to the patient that it was second hand information and I wanted to hear from her. She again cussed at me and screamed at the top of her lungs. I advised patient that I was willing to help her but, if she was going to continue screaming and cussing at me I was going to disconnect the phone conversation. Patient again told me to go \"F_ _ _\" myself so I told her I was disconnecting and hung up the phone.

## 2024-02-19 ENCOUNTER — OFFICE VISIT (OUTPATIENT)
Dept: ENT CLINIC | Age: 39
End: 2024-02-19
Payer: COMMERCIAL

## 2024-02-19 VITALS — WEIGHT: 225 LBS | HEIGHT: 64 IN | BODY MASS INDEX: 38.41 KG/M2

## 2024-02-19 DIAGNOSIS — Z09 SURGERY FOLLOW-UP: Primary | ICD-10-CM

## 2024-02-19 PROCEDURE — 99024 POSTOP FOLLOW-UP VISIT: CPT | Performed by: OTOLARYNGOLOGY

## 2024-02-19 PROCEDURE — 31237 NSL/SINS NDSC SURG BX POLYPC: CPT | Performed by: OTOLARYNGOLOGY

## 2024-02-19 RX ORDER — PREDNISONE 20 MG/1
20 TABLET ORAL 2 TIMES DAILY
Qty: 10 TABLET | Refills: 0 | Status: SHIPPED | OUTPATIENT
Start: 2024-02-19 | End: 2024-02-24

## 2024-02-19 NOTE — PROGRESS NOTES
Reymundo De León MD [address] [address]  P: [phone]        02/19/24    Chief Complaint   Patient presents with    Post-Op Check     POST OP Sinus Sx 2/13/24       HPI:  Mansi  is a 38 y.o. year old female patient seen today following post op from     SINUS ENDOSCOPY FUNCTIONAL SURGERY IMAGE GUIDED Functional Endoscopic Sinus Surgery,  Right Maxillary Antrostomy,  Left Maxillary Antrostomy with Mucosa Removal, Total Ethmoidectomy   INFERIOR TURBINATE REDUCTION   SEPTOPLASTY GRAFT      Performed 2/13/24.    Outpatient Encounter Medications as of 2/19/2024   Medication Sig Dispense Refill    predniSONE (DELTASONE) 20 MG tablet Take 1 tablet by mouth 2 times daily for 5 days 10 tablet 0    cefdinir (OMNICEF) 300 MG capsule Take 1 capsule by mouth 2 times daily for 10 days 20 capsule 0    calcium carbonate (TUMS EX) 750 MG chewable tablet by Oral/Gastric Tube route      albuterol sulfate  (90 Base) MCG/ACT inhaler Inhale 2 puffs into the lungs every 4 hours as needed      amLODIPine (NORVASC) 5 MG tablet Take 1 tablet by mouth daily      [DISCONTINUED] ondansetron (ZOFRAN-ODT) 4 MG disintegrating tablet Take 1 tablet by mouth every 8 hours as needed      [DISCONTINUED] fentaNYL (SUBLIMAZE) injection 100 mcg       [DISCONTINUED] scopolamine (TRANSDERM-SCOP) transdermal patch 1 patch       [DISCONTINUED] lactated ringers IV soln infusion       [DISCONTINUED] sodium chloride flush 0.9 % injection 5-40 mL       [DISCONTINUED] midazolam PF (VERSED) injection 2 mg       [DISCONTINUED] rocuronium (ZEMURON) injection       [DISCONTINUED] lidocaine PF 2 % injection       [DISCONTINUED] fentaNYL (SUBLIMAZE) injection       [DISCONTINUED] propofol infusion       [DISCONTINUED] dexAMETHasone (DECADRON) injection       [DISCONTINUED] ondansetron (ZOFRAN) injection       [DISCONTINUED] midazolam (VERSED) injection       [DISCONTINUED] ceFAZolin (ANCEF) injection       [DISCONTINUED]

## 2024-02-29 ENCOUNTER — TELEPHONE (OUTPATIENT)
Dept: ENT CLINIC | Age: 39
End: 2024-02-29

## 2024-02-29 NOTE — TELEPHONE ENCOUNTER
Pt called p/o line she is s/p sinus surgery on 2/13/24. Patient stated her nose felt clogged and then she blew out a huge blood clot. Per patient no other blooding has occurred. I advised pt this was normal after sinus surgery. Explained that she could feel congestion like that for the next several weeks and at next visit Dr De León would do a debridement. Pt voiced understanding.//kdm

## 2024-03-04 ENCOUNTER — OFFICE VISIT (OUTPATIENT)
Dept: ENT CLINIC | Age: 39
End: 2024-03-04
Payer: COMMERCIAL

## 2024-03-04 VITALS — WEIGHT: 227 LBS | HEIGHT: 64 IN | BODY MASS INDEX: 38.76 KG/M2

## 2024-03-04 DIAGNOSIS — Z09 SURGERY FOLLOW-UP: Primary | ICD-10-CM

## 2024-03-04 DIAGNOSIS — H72.91 TYMPANIC MEMBRANE PERFORATION, RIGHT: ICD-10-CM

## 2024-03-04 DIAGNOSIS — H90.11 CONDUCTIVE HEARING LOSS OF RIGHT EAR WITH UNRESTRICTED HEARING OF LEFT EAR: ICD-10-CM

## 2024-03-04 PROCEDURE — 99213 OFFICE O/P EST LOW 20 MIN: CPT | Performed by: OTOLARYNGOLOGY

## 2024-03-04 PROCEDURE — 31231 NASAL ENDOSCOPY DX: CPT | Performed by: OTOLARYNGOLOGY
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
## 2025-05-20 ENCOUNTER — APPOINTMENT (OUTPATIENT)
Dept: GENERAL RADIOLOGY | Age: 40
End: 2025-05-20
Payer: COMMERCIAL

## 2025-05-20 ENCOUNTER — HOSPITAL ENCOUNTER (EMERGENCY)
Age: 40
Discharge: HOME | End: 2025-05-20
Attending: EMERGENCY MEDICINE
Payer: COMMERCIAL

## 2025-05-20 ENCOUNTER — HOSPITAL ENCOUNTER (EMERGENCY)
Age: 40
Discharge: HOME | End: 2025-05-21
Attending: EMERGENCY MEDICINE
Payer: COMMERCIAL

## 2025-05-20 VITALS
HEIGHT: 63 IN | OXYGEN SATURATION: 100 % | HEART RATE: 98 BPM | RESPIRATION RATE: 23 BRPM | SYSTOLIC BLOOD PRESSURE: 131 MMHG | WEIGHT: 215 LBS | TEMPERATURE: 98.8 F | BODY MASS INDEX: 38.09 KG/M2 | DIASTOLIC BLOOD PRESSURE: 76 MMHG

## 2025-05-20 DIAGNOSIS — J06.9 ACUTE UPPER RESPIRATORY INFECTION: Primary | ICD-10-CM

## 2025-05-20 DIAGNOSIS — J45.901 EXACERBATION OF ASTHMA, UNSPECIFIED ASTHMA SEVERITY, UNSPECIFIED WHETHER PERSISTENT: ICD-10-CM

## 2025-05-20 DIAGNOSIS — J45.21 MILD INTERMITTENT ASTHMA WITH EXACERBATION: Primary | ICD-10-CM

## 2025-05-20 LAB
ALBUMIN SERPL-MCNC: 3.4 G/DL (ref 3.5–5)
ALBUMIN/GLOB SERPL: 1 (ref 1–1.9)
ALP SERPL-CCNC: 77 U/L (ref 35–104)
ALT SERPL-CCNC: 24 U/L (ref 8–45)
ANION GAP SERPL CALC-SCNC: 8 MMOL/L (ref 7–16)
AST SERPL-CCNC: 31 U/L (ref 15–37)
BASOPHILS # BLD: 0.02 K/UL (ref 0–0.2)
BASOPHILS NFR BLD: 0.3 % (ref 0–2)
BILIRUB SERPL-MCNC: 0.2 MG/DL (ref 0–1.2)
BUN SERPL-MCNC: 14 MG/DL (ref 6–23)
CALCIUM SERPL-MCNC: 8.4 MG/DL (ref 8.8–10.2)
CHLORIDE SERPL-SCNC: 109 MMOL/L (ref 98–107)
CO2 SERPL-SCNC: 22 MMOL/L (ref 20–29)
CREAT SERPL-MCNC: 0.82 MG/DL (ref 0.6–1.1)
D DIMER PPP FEU-MCNC: 0.41 UG/ML(FEU)
DIFFERENTIAL METHOD BLD: ABNORMAL
EKG ATRIAL RATE: 101 BPM
EKG DIAGNOSIS: NORMAL
EKG P AXIS: 64 DEGREES
EKG P-R INTERVAL: 168 MS
EKG Q-T INTERVAL: 344 MS
EKG QRS DURATION: 83 MS
EKG QTC CALCULATION (BAZETT): 444 MS
EKG R AXIS: 44 DEGREES
EKG T AXIS: 42 DEGREES
EKG VENTRICULAR RATE: 100 BPM
EOSINOPHIL # BLD: 0.22 K/UL (ref 0–0.8)
EOSINOPHIL NFR BLD: 2.8 % (ref 0.5–7.8)
ERYTHROCYTE [DISTWIDTH] IN BLOOD BY AUTOMATED COUNT: 12.6 % (ref 11.9–14.6)
GLOBULIN SER CALC-MCNC: 3.3 G/DL (ref 2.3–3.5)
GLUCOSE SERPL-MCNC: 100 MG/DL (ref 70–99)
HCT VFR BLD AUTO: 37.7 % (ref 35.8–46.3)
HGB BLD-MCNC: 12.2 G/DL (ref 11.7–15.4)
IMM GRANULOCYTES # BLD AUTO: 0.02 K/UL (ref 0–0.5)
IMM GRANULOCYTES NFR BLD AUTO: 0.3 % (ref 0–5)
LYMPHOCYTES # BLD: 1.13 K/UL (ref 0.5–4.6)
LYMPHOCYTES NFR BLD: 14.6 % (ref 13–44)
MCH RBC QN AUTO: 30.8 PG (ref 26.1–32.9)
MCHC RBC AUTO-ENTMCNC: 32.4 G/DL (ref 31.4–35)
MCV RBC AUTO: 95.2 FL (ref 82–102)
MONOCYTES # BLD: 0.38 K/UL (ref 0.1–1.3)
MONOCYTES NFR BLD: 4.9 % (ref 4–12)
NEUTS SEG # BLD: 5.95 K/UL (ref 1.7–8.2)
NEUTS SEG NFR BLD: 77.1 % (ref 43–78)
NRBC # BLD: 0 K/UL (ref 0–0.2)
PLATELET # BLD AUTO: 211 K/UL (ref 150–450)
PMV BLD AUTO: 10 FL (ref 9.4–12.3)
POTASSIUM SERPL-SCNC: 4.5 MMOL/L (ref 3.5–5.1)
PROT SERPL-MCNC: 6.7 G/DL (ref 6.3–8.2)
RBC # BLD AUTO: 3.96 M/UL (ref 4.05–5.2)
SODIUM SERPL-SCNC: 139 MMOL/L (ref 136–145)
TROPONIN T SERPL HS-MCNC: <6 NG/L (ref 0–14)
WBC # BLD AUTO: 7.7 K/UL (ref 4.3–11.1)

## 2025-05-20 PROCEDURE — 80053 COMPREHEN METABOLIC PANEL: CPT

## 2025-05-20 PROCEDURE — 2500000003 HC RX 250 WO HCPCS: Performed by: EMERGENCY MEDICINE

## 2025-05-20 PROCEDURE — 71046 X-RAY EXAM CHEST 2 VIEWS: CPT

## 2025-05-20 PROCEDURE — 85025 COMPLETE CBC W/AUTO DIFF WBC: CPT

## 2025-05-20 PROCEDURE — 6370000000 HC RX 637 (ALT 250 FOR IP): Performed by: EMERGENCY MEDICINE

## 2025-05-20 PROCEDURE — 93005 ELECTROCARDIOGRAM TRACING: CPT | Performed by: EMERGENCY MEDICINE

## 2025-05-20 PROCEDURE — 6360000002 HC RX W HCPCS: Performed by: EMERGENCY MEDICINE

## 2025-05-20 PROCEDURE — 84484 ASSAY OF TROPONIN QUANT: CPT

## 2025-05-20 PROCEDURE — 99285 EMERGENCY DEPT VISIT HI MDM: CPT

## 2025-05-20 PROCEDURE — 94640 AIRWAY INHALATION TREATMENT: CPT

## 2025-05-20 PROCEDURE — 93010 ELECTROCARDIOGRAM REPORT: CPT | Performed by: INTERNAL MEDICINE

## 2025-05-20 PROCEDURE — 85379 FIBRIN DEGRADATION QUANT: CPT

## 2025-05-20 PROCEDURE — 96374 THER/PROPH/DIAG INJ IV PUSH: CPT

## 2025-05-20 RX ORDER — IPRATROPIUM BROMIDE AND ALBUTEROL SULFATE 2.5; .5 MG/3ML; MG/3ML
1 SOLUTION RESPIRATORY (INHALATION)
Status: COMPLETED | OUTPATIENT
Start: 2025-05-20 | End: 2025-05-20

## 2025-05-20 RX ORDER — PREDNISONE 20 MG/1
20 TABLET ORAL 2 TIMES DAILY
Qty: 10 TABLET | Refills: 0 | Status: SHIPPED | OUTPATIENT
Start: 2025-05-20 | End: 2025-05-25

## 2025-05-20 RX ORDER — IPRATROPIUM BROMIDE AND ALBUTEROL SULFATE 2.5; .5 MG/3ML; MG/3ML
1 SOLUTION RESPIRATORY (INHALATION)
Status: COMPLETED | OUTPATIENT
Start: 2025-05-21 | End: 2025-05-21

## 2025-05-20 RX ORDER — MONTELUKAST SODIUM 5 MG/1
5 TABLET, CHEWABLE ORAL NIGHTLY
Qty: 30 TABLET | Refills: 3 | Status: SHIPPED | OUTPATIENT
Start: 2025-05-20

## 2025-05-20 RX ORDER — KETOROLAC TROMETHAMINE 15 MG/ML
15 INJECTION, SOLUTION INTRAMUSCULAR; INTRAVENOUS ONCE
Status: COMPLETED | OUTPATIENT
Start: 2025-05-21 | End: 2025-05-21

## 2025-05-20 RX ADMIN — METHYLPREDNISOLONE SODIUM SUCCINATE 125 MG: 125 INJECTION INTRAMUSCULAR; INTRAVENOUS at 12:42

## 2025-05-20 RX ADMIN — IPRATROPIUM BROMIDE AND ALBUTEROL SULFATE 1 DOSE: 2.5; .5 SOLUTION RESPIRATORY (INHALATION) at 12:31

## 2025-05-20 ASSESSMENT — PAIN DESCRIPTION - PAIN TYPE
TYPE: ACUTE PAIN
TYPE: ACUTE PAIN

## 2025-05-20 ASSESSMENT — LIFESTYLE VARIABLES
HOW OFTEN DO YOU HAVE A DRINK CONTAINING ALCOHOL: NEVER
HOW MANY STANDARD DRINKS CONTAINING ALCOHOL DO YOU HAVE ON A TYPICAL DAY: PATIENT DOES NOT DRINK
HOW OFTEN DO YOU HAVE A DRINK CONTAINING ALCOHOL: NEVER
HOW MANY STANDARD DRINKS CONTAINING ALCOHOL DO YOU HAVE ON A TYPICAL DAY: PATIENT DOES NOT DRINK

## 2025-05-20 ASSESSMENT — ENCOUNTER SYMPTOMS
SHORTNESS OF BREATH: 1
COUGH: 1
WHEEZING: 1

## 2025-05-20 ASSESSMENT — PAIN DESCRIPTION - LOCATION
LOCATION: CHEST
LOCATION: CHEST

## 2025-05-20 ASSESSMENT — PAIN SCALES - GENERAL
PAINLEVEL_OUTOF10: 10
PAINLEVEL_OUTOF10: 6

## 2025-05-20 ASSESSMENT — PAIN - FUNCTIONAL ASSESSMENT
PAIN_FUNCTIONAL_ASSESSMENT: 0-10
PAIN_FUNCTIONAL_ASSESSMENT: 0-10

## 2025-05-20 ASSESSMENT — PAIN DESCRIPTION - DESCRIPTORS
DESCRIPTORS: TIGHTNESS
DESCRIPTORS: TIGHTNESS

## 2025-05-20 NOTE — ED PROVIDER NOTES
Emergency Department Provider Note       E EMERGENCY DEPT   PCP: Ryan, Not On, MD   Age: 39 y.o.   Sex: female     DISPOSITION Decision To Discharge 05/20/2025 01:45:19 PM    ICD-10-CM    1. Mild intermittent asthma with exacerbation  J45.21           Medical Decision Making     DDX:    Acute exacerbation asthma, COPD, CHF, COVID-19, influenza    Bronchitis, aspiration pneumonia, pneumonia,    PE, rib fracture, rib dysfunction, pleurisy, pneumothorax, aspiration of foreign body    ED Course as of 05/20/25 1347   Tue May 20, 2025   1344 I talked to the patient and she is doing better.  The wheezing has improved significantly.  We talked about the need for short course of steroids.  She has a rescue inhaler and does not need any additional prescriptions for this.  I encouraged her to start using Singulair at nighttime and to continue with her daily antihistamine. [KH]      ED Course User Index  [KH] Dayne Farrell, DO     1 chronic illness with exacerbation.  Prescription drug management performed.  Shared medical decision making was utilized in creating the patients health plan today.  I independently ordered and reviewed each unique test.    I reviewed external records: ED visit note from a different ED.   I reviewed external records: provider visit note from PCP.  I reviewed external records: provider visit note from outside specialist.  I reviewed external records: previous lab results from outside ED.  I reviewed external records: previous imaging study including radiologist interpretation.     ED cardiac monitoring rhythm strip was ordered and interpreted:  sinus rhythm, no evidence of an arrhythmia  ST Segments:Normal ST segments - NO STEMI   Rate: 100  I interpreted the X-rays no pneumothorax.  ED provider's independent EKG interpretation sinus rhythm no ST elevation            History     39-year-old female presenting to the emergency department today complaining of shortness of breath and wheezing which

## 2025-05-20 NOTE — ED NOTES
Patient mobility status  with no difficulty.     I have reviewed discharge instructions with the patient.  The patient verbalized understanding.    Patient left ED via Discharge Method: ambulatory to Home with self.    Opportunity for questions and clarification provided.     Patient given 2 scripts.

## 2025-05-20 NOTE — ED TRIAGE NOTES
Patient to triage with c/o cough, chest pain, and SOB. Pt states she has been using her inhaler but it has not helped.

## 2025-05-20 NOTE — DISCHARGE INSTRUCTIONS
Continue taking your Allegra daily as prescribed.  Start taking the Singulair once every evening for the next 10 days then as needed.  Take the full course of steroids as prescribed and continue to use your rescue inhaler every 4-6 hours as needed.

## 2025-05-21 ENCOUNTER — APPOINTMENT (OUTPATIENT)
Dept: CT IMAGING | Age: 40
End: 2025-05-21
Payer: COMMERCIAL

## 2025-05-21 VITALS
HEIGHT: 63 IN | OXYGEN SATURATION: 97 % | RESPIRATION RATE: 24 BRPM | DIASTOLIC BLOOD PRESSURE: 77 MMHG | HEART RATE: 85 BPM | BODY MASS INDEX: 38.09 KG/M2 | WEIGHT: 215 LBS | TEMPERATURE: 98.9 F | SYSTOLIC BLOOD PRESSURE: 123 MMHG

## 2025-05-21 LAB
ALBUMIN SERPL-MCNC: 3.4 G/DL (ref 3.5–5)
ALBUMIN/GLOB SERPL: 1 (ref 1–1.9)
ALP SERPL-CCNC: 80 U/L (ref 35–104)
ALT SERPL-CCNC: 21 U/L (ref 8–45)
ANION GAP SERPL CALC-SCNC: 13 MMOL/L (ref 7–16)
AST SERPL-CCNC: 20 U/L (ref 15–37)
B PERT DNA SPEC QL NAA+PROBE: NOT DETECTED
BASOPHILS # BLD: 0.01 K/UL (ref 0–0.2)
BASOPHILS NFR BLD: 0.1 % (ref 0–2)
BILIRUB SERPL-MCNC: <0.2 MG/DL (ref 0–1.2)
BORDETELLA PARAPERTUSSIS BY PCR: NOT DETECTED
BUN SERPL-MCNC: 13 MG/DL (ref 6–23)
C PNEUM DNA SPEC QL NAA+PROBE: NOT DETECTED
CALCIUM SERPL-MCNC: 8.9 MG/DL (ref 8.8–10.2)
CHLORIDE SERPL-SCNC: 105 MMOL/L (ref 98–107)
CO2 SERPL-SCNC: 19 MMOL/L (ref 20–29)
CREAT SERPL-MCNC: 0.83 MG/DL (ref 0.6–1.1)
DIFFERENTIAL METHOD BLD: ABNORMAL
EKG ATRIAL RATE: 98 BPM
EKG DIAGNOSIS: NORMAL
EKG P AXIS: 67 DEGREES
EKG P-R INTERVAL: 163 MS
EKG Q-T INTERVAL: 357 MS
EKG QRS DURATION: 82 MS
EKG QTC CALCULATION (BAZETT): 454 MS
EKG R AXIS: 50 DEGREES
EKG T AXIS: 51 DEGREES
EKG VENTRICULAR RATE: 97 BPM
EOSINOPHIL # BLD: 0 K/UL (ref 0–0.8)
EOSINOPHIL NFR BLD: 0 % (ref 0.5–7.8)
ERYTHROCYTE [DISTWIDTH] IN BLOOD BY AUTOMATED COUNT: 12.5 % (ref 11.9–14.6)
FLUAV SUBTYP SPEC NAA+PROBE: NOT DETECTED
FLUBV RNA SPEC QL NAA+PROBE: NOT DETECTED
GLOBULIN SER CALC-MCNC: 3.5 G/DL (ref 2.3–3.5)
GLUCOSE SERPL-MCNC: 217 MG/DL (ref 70–99)
HADV DNA SPEC QL NAA+PROBE: NOT DETECTED
HCOV 229E RNA SPEC QL NAA+PROBE: NOT DETECTED
HCOV HKU1 RNA SPEC QL NAA+PROBE: NOT DETECTED
HCOV NL63 RNA SPEC QL NAA+PROBE: NOT DETECTED
HCOV OC43 RNA SPEC QL NAA+PROBE: NOT DETECTED
HCT VFR BLD AUTO: 37.1 % (ref 35.8–46.3)
HGB BLD-MCNC: 12.2 G/DL (ref 11.7–15.4)
HMPV RNA SPEC QL NAA+PROBE: NOT DETECTED
HPIV1 RNA SPEC QL NAA+PROBE: NOT DETECTED
HPIV2 RNA SPEC QL NAA+PROBE: NOT DETECTED
HPIV3 RNA SPEC QL NAA+PROBE: NOT DETECTED
HPIV4 RNA SPEC QL NAA+PROBE: NOT DETECTED
IMM GRANULOCYTES # BLD AUTO: 0.08 K/UL (ref 0–0.5)
IMM GRANULOCYTES NFR BLD AUTO: 0.8 % (ref 0–5)
LYMPHOCYTES # BLD: 0.54 K/UL (ref 0.5–4.6)
LYMPHOCYTES NFR BLD: 5.6 % (ref 13–44)
M PNEUMO DNA SPEC QL NAA+PROBE: NOT DETECTED
MAGNESIUM SERPL-MCNC: 1.8 MG/DL (ref 1.8–2.4)
MCH RBC QN AUTO: 31 PG (ref 26.1–32.9)
MCHC RBC AUTO-ENTMCNC: 32.9 G/DL (ref 31.4–35)
MCV RBC AUTO: 94.4 FL (ref 82–102)
MONOCYTES # BLD: 0.09 K/UL (ref 0.1–1.3)
MONOCYTES NFR BLD: 0.9 % (ref 4–12)
NEUTS SEG # BLD: 8.97 K/UL (ref 1.7–8.2)
NEUTS SEG NFR BLD: 92.6 % (ref 43–78)
NRBC # BLD: 0 K/UL (ref 0–0.2)
NT PRO BNP: 217 PG/ML (ref 0–125)
PLATELET # BLD AUTO: 237 K/UL (ref 150–450)
PMV BLD AUTO: 10.1 FL (ref 9.4–12.3)
POTASSIUM SERPL-SCNC: 4.2 MMOL/L (ref 3.5–5.1)
PROT SERPL-MCNC: 6.8 G/DL (ref 6.3–8.2)
RBC # BLD AUTO: 3.93 M/UL (ref 4.05–5.2)
RSV RNA SPEC QL NAA+PROBE: NOT DETECTED
RV+EV RNA SPEC QL NAA+PROBE: DETECTED
SARS-COV-2 RNA RESP QL NAA+PROBE: NOT DETECTED
SODIUM SERPL-SCNC: 137 MMOL/L (ref 136–145)
TROPONIN T SERPL HS-MCNC: <6 NG/L (ref 0–14)
WBC # BLD AUTO: 9.7 K/UL (ref 4.3–11.1)

## 2025-05-21 PROCEDURE — 94640 AIRWAY INHALATION TREATMENT: CPT

## 2025-05-21 PROCEDURE — 6360000002 HC RX W HCPCS: Performed by: EMERGENCY MEDICINE

## 2025-05-21 PROCEDURE — 85025 COMPLETE CBC W/AUTO DIFF WBC: CPT

## 2025-05-21 PROCEDURE — 83735 ASSAY OF MAGNESIUM: CPT

## 2025-05-21 PROCEDURE — 93005 ELECTROCARDIOGRAM TRACING: CPT | Performed by: EMERGENCY MEDICINE

## 2025-05-21 PROCEDURE — 6360000004 HC RX CONTRAST MEDICATION: Performed by: EMERGENCY MEDICINE

## 2025-05-21 PROCEDURE — 0202U NFCT DS 22 TRGT SARS-COV-2: CPT

## 2025-05-21 PROCEDURE — 83880 ASSAY OF NATRIURETIC PEPTIDE: CPT

## 2025-05-21 PROCEDURE — 71260 CT THORAX DX C+: CPT

## 2025-05-21 PROCEDURE — 6370000000 HC RX 637 (ALT 250 FOR IP): Performed by: EMERGENCY MEDICINE

## 2025-05-21 PROCEDURE — 96374 THER/PROPH/DIAG INJ IV PUSH: CPT

## 2025-05-21 PROCEDURE — 80053 COMPREHEN METABOLIC PANEL: CPT

## 2025-05-21 PROCEDURE — 94761 N-INVAS EAR/PLS OXIMETRY MLT: CPT

## 2025-05-21 PROCEDURE — 93010 ELECTROCARDIOGRAM REPORT: CPT | Performed by: INTERNAL MEDICINE

## 2025-05-21 PROCEDURE — 84484 ASSAY OF TROPONIN QUANT: CPT

## 2025-05-21 PROCEDURE — 96375 TX/PRO/DX INJ NEW DRUG ADDON: CPT

## 2025-05-21 RX ORDER — NALBUPHINE HYDROCHLORIDE 10 MG/ML
5 INJECTION INTRAMUSCULAR; INTRAVENOUS; SUBCUTANEOUS
Status: COMPLETED | OUTPATIENT
Start: 2025-05-21 | End: 2025-05-21

## 2025-05-21 RX ORDER — IOPAMIDOL 755 MG/ML
100 INJECTION, SOLUTION INTRAVASCULAR
Status: COMPLETED | OUTPATIENT
Start: 2025-05-21 | End: 2025-05-21

## 2025-05-21 RX ORDER — BENZONATATE 200 MG/1
200 CAPSULE ORAL 3 TIMES DAILY PRN
Qty: 30 CAPSULE | Refills: 0 | Status: SHIPPED | OUTPATIENT
Start: 2025-05-21 | End: 2025-05-31

## 2025-05-21 RX ADMIN — IOPAMIDOL 100 ML: 755 INJECTION, SOLUTION INTRAVENOUS at 00:55

## 2025-05-21 RX ADMIN — NALBUPHINE HYDROCHLORIDE 5 MG: 10 INJECTION, SOLUTION INTRAMUSCULAR; INTRAVENOUS; SUBCUTANEOUS at 01:40

## 2025-05-21 RX ADMIN — IPRATROPIUM BROMIDE AND ALBUTEROL SULFATE 1 DOSE: 2.5; .5 SOLUTION RESPIRATORY (INHALATION) at 00:13

## 2025-05-21 RX ADMIN — KETOROLAC TROMETHAMINE 15 MG: 15 INJECTION, SOLUTION INTRAMUSCULAR; INTRAVENOUS at 00:29

## 2025-05-21 ASSESSMENT — PAIN SCALES - GENERAL
PAINLEVEL_OUTOF10: 10
PAINLEVEL_OUTOF10: 10

## 2025-05-21 NOTE — DISCHARGE INSTRUCTIONS
Continue prednisone prescribed previously.  Take Tessalon for cough.  Take Aleve or Motrin as needed for pain.  Follow-up closely with your doctor.  Continue albuterol.  Return for worsening or concerning symptoms.

## 2025-05-21 NOTE — ED NOTES
Pt ambulated in the [facility]. Pt oxygen before ambulation was 96% on room air and HR was in the 110's. Pt oxygen saturation stayed between 98-99% on room air while ambulating and HR stayed around 110-115. Pt did report some SOB and fatigue when ambulating.

## 2025-05-21 NOTE — ED TRIAGE NOTES
Pt presents ambulatory to triage with ongoing SOB.  Pt states she has tightness in her chest and headache.  Pt O2 was going between 82 and 93 while laying down.  Heart rate is 120s in triage immediately after ambulating.    Pt was seen earlier today and had r/o PE workup.

## 2025-05-21 NOTE — ED NOTES
Patient mobility status  with no difficulty.     I have reviewed discharge instructions with the patient and daughter.  The patient and daughter verbalized understanding.    Patient left ED via Discharge Method: ambulatory to Home with  daughter .    Opportunity for questions and clarification provided.     Patient given 1 scripts.

## 2025-05-21 NOTE — ED PROVIDER NOTES
MCG/ACT INHALER    Inhale 2 puffs into the lungs every 4 hours as needed    AMLODIPINE (NORVASC) 5 MG TABLET    Take 1 tablet by mouth daily    CALCIUM CARBONATE (TUMS EX) 750 MG CHEWABLE TABLET    by Oral/Gastric Tube route    MONTELUKAST (SINGULAIR) 5 MG CHEWABLE TABLET    Take 1 tablet by mouth nightly    PREDNISONE (DELTASONE) 20 MG TABLET    Take 1 tablet by mouth 2 times daily for 5 days        Results from this emergency department visit:      Results for orders placed or performed during the hospital encounter of 05/20/25   CT CHEST PULMONARY EMBOLISM W CONTRAST    Narrative    EXAMINATION: CT CHEST PULMONARY EMBOLISM W CONTRAST 5/21/2025 1:06 AM    ACCESSION NUMBER: [id number]    COMPARISON: CT chest with contrast 5/8/2017, x-ray chest 2 views 5/20/2025    INDICATION: Hypoxia, cp, sob, tachycardia.    TECHNIQUE: Multiple contiguous 2D axial CT images of the chest were obtained  from the lung apices to the lung bases after the intravenous administration of  100mL Iso-priyanka 370 per pulmonary angiography protocol. Post processed 2D and 3D  series were included.    Radiation dose reduction techniques were used for this study. Our CT scanners  use one or all of the following: Automated exposure control, adjustment of the  mA and/or kV according to patient size, iterative reconstruction.    FINDINGS:  STUDY QUALITY: The exam study quality is good.    The heart is normal in size. There is no pericardial or pleural effusion. There  is no hilar or mediastinal lymphadenopathy. There is no thoracic or included  abdominal aortic aneurysm or dissection. There is no suggested pulmonary  embolism. Included view of the upper abdomen demonstrates prior cholecystectomy.  There is no focal abnormality seen of the imaged portions of the liver, spleen,  pancreas, adrenal glands, or kidneys. There is no pneumothorax. There is no  pulmonary parenchymal mass, soft tissue density nodule, or infiltrate. There is  no acute fracture.

## (undated) DEVICE — BLOCK BITE AD 60FR W/ VELC STRP ADDRESSES MOST PT AND

## (undated) DEVICE — DRAPE TWL SURG 16X26IN BLU ORB04] ALLCARE INC]

## (undated) DEVICE — KENDALL SCD EXPRESS SLEEVES, KNEE LENGTH, MEDIUM: Brand: KENDALL SCD

## (undated) DEVICE — TROCAR: Brand: KII® SLEEVE

## (undated) DEVICE — RETRIEVAL BALLOON CATHETER: Brand: EXTRACTOR™ PRO RX

## (undated) DEVICE — GLOVE ORANGE PI 8   MSG9080

## (undated) DEVICE — 2, DISPOSABLE SUCTION/IRRIGATOR WITHOUT DISPOSABLE TIP: Brand: STRYKEFLOW

## (undated) DEVICE — BLADE 1884080EM TRICUT 4MMX13CM M4 ROHS: Brand: FUSION®

## (undated) DEVICE — CANISTER, RIGID, 2000CC: Brand: MEDLINE INDUSTRIES, INC.

## (undated) DEVICE — (D)PREP SKN CHLRAPRP APPL 26ML -- CONVERT TO ITEM 371833

## (undated) DEVICE — SPLINT NSL SEPTAL SUPP REG PRE PUNCHED HOLE SIL STRL BRTH EZ

## (undated) DEVICE — SPONGE LAPAROTOMY W18XL18IN WHITE STRUNG RADIOPAQUE STERILE

## (undated) DEVICE — SYRINGE MEDICAL 3ML CLEAR PLASTIC STANDARD NON CONTROL LUERLOCK TIP DISPOSABLE

## (undated) DEVICE — SINU FOAM: Brand: SINU-FOAM

## (undated) DEVICE — NDL PRT INJ NSAF BLNT 18GX1.5 --

## (undated) DEVICE — DEVICE LCK BILI RAP EXCHG OLPS --

## (undated) DEVICE — GAUZE,SPONGE,2"X2",8PLY,STERILE,LF,2'S: Brand: MEDLINE

## (undated) DEVICE — SUTURE SZ 0 27IN 5/8 CIR UR-6  TAPER PT VIOLET ABSRB VICRYL J603H

## (undated) DEVICE — TUBING 1895522 5PK STRAIGHTSHOT TO XPS: Brand: STRAIGHTSHOT®

## (undated) DEVICE — BAG SPEC REM 224ML W4XL6IN DIA10MM 1 HND GYN DISP ENDOPCH

## (undated) DEVICE — CANNULA NSL ORAL AD FOR CAPNOFLEX CO2 O2 AIRLFE

## (undated) DEVICE — SHEARS ENDOSCP L36CM DIA5MM ULTRASONIC CRV TIP W/ ADV

## (undated) DEVICE — REM POLYHESIVE ADULT PATIENT RETURN ELECTRODE: Brand: VALLEYLAB

## (undated) DEVICE — CONTAINER SPEC FRMLN 120ML --

## (undated) DEVICE — TROCAR: Brand: KII FIOS FIRST ENTRY

## (undated) DEVICE — GENERAL LAPAROSCOPY: Brand: MEDLINE INDUSTRIES, INC.

## (undated) DEVICE — SPHINCTEROTOME: Brand: JAGTOME RX 39

## (undated) DEVICE — BUTTON SWITCH PENCIL BLADE ELECTRODE, HOLSTER: Brand: EDGE

## (undated) DEVICE — SOLUTION IV 1000ML 0.9% SOD CHL

## (undated) DEVICE — 3M™ TEGADERM™ TRANSPARENT FILM DRESSING FRAME STYLE, 1624W, 2-3/8 IN X 2-3/4 IN (6 CM X 7 CM), 100/CT 4CT/CASE: Brand: 3M™ TEGADERM™

## (undated) DEVICE — SPONGE,NEURO,0.5"X3",XR,STRL,LF,10/PK: Brand: MEDLINE

## (undated) DEVICE — KENDALL RADIOLUCENT FOAM MONITORING ELECTRODE RECTANGULAR SHAPE: Brand: KENDALL

## (undated) DEVICE — GARMENT,MEDLINE,DVT,INT,CALF,MED, GEN2: Brand: MEDLINE

## (undated) DEVICE — ELECTROSURGICAL SUCTION COAGULATOR 10FR

## (undated) DEVICE — SOLUTION IRRIG 3000ML 0.9% SOD CHL FLX CONT 0797208] ICU MEDICAL INC]

## (undated) DEVICE — 1000 S-DRAPE TOWEL DRAPE 10/BX: Brand: STERI-DRAPE™

## (undated) DEVICE — KIT PROCEDURE SURG HEAD AND NECK TOTE

## (undated) DEVICE — PATIENT TRACKER 9734887XOM NON-INVASIVE

## (undated) DEVICE — SOCK SPEC L9IN WHT UNIV W/ STD PRT FOR FLD MGMT

## (undated) DEVICE — SYR 5ML 1/5 GRAD LL NSAF LF --

## (undated) DEVICE — SYR 3ML LL TIP 1/10ML GRAD --

## (undated) DEVICE — SUTURE MCRYL SZ 4-0 L27IN ABSRB UD L19MM PS-2 1/2 CIR PRIM Y426H

## (undated) DEVICE — KIT,ANTI FOG,W/SPONGE & FLUID,SOFT PACK: Brand: MEDLINE

## (undated) DEVICE — NEEDLE HYPO 21GA L1.5IN INTRAMUSCULAR S STL LATCH BVL UP

## (undated) DEVICE — SOLUTION IRRIG 1000ML STRL H2O USP PLAS POUR BTL

## (undated) DEVICE — APPLIER CLP M/L SHFT DIA5MM 15 LIG LIGAMAX 5

## (undated) DEVICE — (D)SYR 10ML 1/5ML GRAD NSAF -- PKGING CHANGE USE ITEM 338027

## (undated) DEVICE — ADULT SPO2 SENSOR: Brand: NELLCOR

## (undated) DEVICE — [HIGH FLOW INSUFFLATOR,  DO NOT USE IF PACKAGE IS DAMAGED,  KEEP DRY,  KEEP AWAY FROM SUNLIGHT,  PROTECT FROM HEAT AND RADIOACTIVE SOURCES.]: Brand: PNEUMOSURE

## (undated) DEVICE — 1010 S-DRAPE TOWEL DRAPE 10/BX: Brand: STERI-DRAPE™

## (undated) DEVICE — SUTURE CHROMIC GUT SZ 4-0 L27IN ABSRB BRN L17MM RB-1 1/2 U203H

## (undated) DEVICE — ELECTRODE PT RET AD L9FT HI MOIST COND ADH HYDRGEL CORDED

## (undated) DEVICE — TUBING, SUCTION, 1/4" X 10', STRAIGHT: Brand: MEDLINE

## (undated) DEVICE — TROCARS: Brand: KII® BLUNT TIP ACCESS SYSTEM

## (undated) DEVICE — INSTRUMENT TRACKER 9733533XOM ENT 1PK

## (undated) DEVICE — DERMABOND SKIN ADH 0.7ML -- DERMABOND ADVANCED 12/BX